# Patient Record
Sex: FEMALE | Race: BLACK OR AFRICAN AMERICAN | NOT HISPANIC OR LATINO | Employment: FULL TIME | URBAN - METROPOLITAN AREA
[De-identification: names, ages, dates, MRNs, and addresses within clinical notes are randomized per-mention and may not be internally consistent; named-entity substitution may affect disease eponyms.]

---

## 2017-08-20 ENCOUNTER — HOSPITAL ENCOUNTER (EMERGENCY)
Facility: OTHER | Age: 37
Discharge: HOME | End: 2017-08-20
Attending: INTERNAL MEDICINE
Payer: COMMERCIAL

## 2017-08-20 VITALS
HEIGHT: 62 IN | HEART RATE: 88 BPM | SYSTOLIC BLOOD PRESSURE: 154 MMHG | TEMPERATURE: 97 F | OXYGEN SATURATION: 97 % | BODY MASS INDEX: 39.01 KG/M2 | WEIGHT: 212 LBS | RESPIRATION RATE: 18 BRPM | DIASTOLIC BLOOD PRESSURE: 98 MMHG

## 2017-08-20 DIAGNOSIS — S00.83XA CONTUSION OF FACE, INITIAL ENCOUNTER: Primary | ICD-10-CM

## 2017-08-20 PROCEDURE — 25000003 PHARM REV CODE 250: Performed by: INTERNAL MEDICINE

## 2017-08-20 PROCEDURE — 99284 EMERGENCY DEPT VISIT MOD MDM: CPT

## 2017-08-20 RX ORDER — IBUPROFEN 400 MG/1
800 TABLET ORAL
Status: COMPLETED | OUTPATIENT
Start: 2017-08-20 | End: 2017-08-20

## 2017-08-20 RX ORDER — IBUPROFEN 800 MG/1
800 TABLET ORAL EVERY 8 HOURS PRN
Qty: 30 TABLET | Refills: 0 | Status: SHIPPED | OUTPATIENT
Start: 2017-08-20 | End: 2020-05-11

## 2017-08-20 RX ADMIN — IBUPROFEN 800 MG: 400 TABLET, FILM COATED ORAL at 01:08
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
## 2018-04-01 ENCOUNTER — HOSPITAL ENCOUNTER (EMERGENCY)
Facility: OTHER | Age: 38
Discharge: HOME | End: 2018-04-02
Attending: EMERGENCY MEDICINE
Payer: COMMERCIAL

## 2018-04-01 DIAGNOSIS — R10.13 EPIGASTRIC PAIN: Primary | ICD-10-CM

## 2018-04-01 DIAGNOSIS — R00.0 TACHYCARDIA: ICD-10-CM

## 2018-04-01 PROCEDURE — 96374 THER/PROPH/DIAG INJ IV PUSH: CPT

## 2018-04-01 PROCEDURE — 96375 TX/PRO/DX INJ NEW DRUG ADDON: CPT

## 2018-04-01 PROCEDURE — 93005 ELECTROCARDIOGRAM TRACING: CPT

## 2018-04-01 PROCEDURE — 96361 HYDRATE IV INFUSION ADD-ON: CPT

## 2018-04-01 PROCEDURE — 99284 EMERGENCY DEPT VISIT MOD MDM: CPT | Mod: 25

## 2018-04-01 PROCEDURE — 93010 ELECTROCARDIOGRAM REPORT: CPT | Mod: ,,, | Performed by: INTERNAL MEDICINE

## 2018-04-01 PROCEDURE — 25000003 PHARM REV CODE 250: Performed by: EMERGENCY MEDICINE

## 2018-04-01 PROCEDURE — 63600175 PHARM REV CODE 636 W HCPCS: Performed by: EMERGENCY MEDICINE

## 2018-04-01 RX ORDER — SODIUM CHLORIDE 9 MG/ML
1000 INJECTION, SOLUTION INTRAVENOUS
Status: COMPLETED | OUTPATIENT
Start: 2018-04-01 | End: 2018-04-02

## 2018-04-01 RX ORDER — ONDANSETRON 2 MG/ML
8 INJECTION INTRAMUSCULAR; INTRAVENOUS
Status: COMPLETED | OUTPATIENT
Start: 2018-04-01 | End: 2018-04-01

## 2018-04-01 RX ORDER — KETOROLAC TROMETHAMINE 30 MG/ML
30 INJECTION, SOLUTION INTRAMUSCULAR; INTRAVENOUS
Status: COMPLETED | OUTPATIENT
Start: 2018-04-01 | End: 2018-04-01

## 2018-04-01 RX ADMIN — LIDOCAINE HYDROCHLORIDE: 20 SOLUTION ORAL; TOPICAL at 11:04

## 2018-04-01 RX ADMIN — ONDANSETRON 8 MG: 2 INJECTION INTRAMUSCULAR; INTRAVENOUS at 11:04

## 2018-04-01 RX ADMIN — SODIUM CHLORIDE 1000 ML: 0.9 INJECTION, SOLUTION INTRAVENOUS at 11:04

## 2018-04-01 RX ADMIN — KETOROLAC TROMETHAMINE 30 MG: 30 INJECTION, SOLUTION INTRAMUSCULAR at 11:04

## 2018-04-02 VITALS
SYSTOLIC BLOOD PRESSURE: 121 MMHG | BODY MASS INDEX: 38.64 KG/M2 | HEIGHT: 62 IN | OXYGEN SATURATION: 95 % | TEMPERATURE: 99 F | WEIGHT: 210 LBS | HEART RATE: 110 BPM | DIASTOLIC BLOOD PRESSURE: 70 MMHG | RESPIRATION RATE: 18 BRPM

## 2018-04-02 LAB
ALBUMIN SERPL-MCNC: 4 G/DL (ref 3.3–5.5)
ALBUMIN SERPL-MCNC: 4.3 G/DL (ref 3.3–5.5)
ALP SERPL-CCNC: 100 U/L (ref 42–141)
ALP SERPL-CCNC: 94 U/L (ref 42–141)
BILIRUB SERPL-MCNC: 0.7 MG/DL (ref 0.2–1.6)
BILIRUB SERPL-MCNC: 0.7 MG/DL (ref 0.2–1.6)
BILIRUBIN, POC UA: NEGATIVE
BLOOD, POC UA: ABNORMAL
BUN SERPL-MCNC: 7 MG/DL (ref 7–22)
CALCIUM SERPL-MCNC: 9 MG/DL (ref 8–10.3)
CHLORIDE SERPL-SCNC: 103 MMOL/L (ref 98–108)
CLARITY, POC UA: CLEAR
COLOR, POC UA: YELLOW
CREAT SERPL-MCNC: 0.9 MG/DL (ref 0.6–1.2)
GLUCOSE SERPL-MCNC: 114 MG/DL (ref 73–118)
GLUCOSE, POC UA: NEGATIVE
HCO3 UR-SCNC: 26.2 MMOL/L (ref 24–28)
KETONES, POC UA: ABNORMAL
LDH SERPL L TO P-CCNC: 0.91 MMOL/L (ref 0.5–2.2)
LEUKOCYTE EST, POC UA: NEGATIVE
NITRITE, POC UA: NEGATIVE
PCO2 BLDA: 41.9 MMHG (ref 35–45)
PH SMN: 7.4 [PH] (ref 7.35–7.45)
PH UR STRIP: 6 [PH]
PO2 BLDA: 28 MMHG (ref 40–60)
POC ALT (SGPT): 15 U/L (ref 10–47)
POC ALT (SGPT): <5 U/L (ref 10–47)
POC AMYLASE: 35 U/L (ref 14–97)
POC AST (SGOT): 25 U/L (ref 11–38)
POC AST (SGOT): 26 U/L (ref 11–38)
POC BE: 1 MMOL/L
POC GGT: 32 U/L (ref 5–65)
POC SATURATED O2: 52 % (ref 95–100)
POC TCO2: 24 MMOL/L (ref 18–33)
POC TCO2: 27 MMOL/L (ref 24–29)
POTASSIUM BLD-SCNC: 3.6 MMOL/L (ref 3.6–5.1)
PROTEIN, POC UA: NEGATIVE
PROTEIN, POC: 7.8 G/DL (ref 6.4–8.1)
PROTEIN, POC: 7.9 G/DL (ref 6.4–8.1)
SAMPLE: ABNORMAL
SODIUM BLD-SCNC: 141 MMOL/L (ref 128–145)
SPECIFIC GRAVITY, POC UA: >=1.03
UROBILINOGEN, POC UA: 0.2 E.U./DL

## 2018-04-02 PROCEDURE — 81003 URINALYSIS AUTO W/O SCOPE: CPT

## 2018-04-02 PROCEDURE — 63600175 PHARM REV CODE 636 W HCPCS: Performed by: EMERGENCY MEDICINE

## 2018-04-02 PROCEDURE — 25500020 PHARM REV CODE 255: Performed by: EMERGENCY MEDICINE

## 2018-04-02 PROCEDURE — 80053 COMPREHEN METABOLIC PANEL: CPT

## 2018-04-02 PROCEDURE — 85025 COMPLETE CBC W/AUTO DIFF WBC: CPT

## 2018-04-02 PROCEDURE — 82040 ASSAY OF SERUM ALBUMIN: CPT

## 2018-04-02 PROCEDURE — 82803 BLOOD GASES ANY COMBINATION: CPT

## 2018-04-02 RX ORDER — METHYLPREDNISOLONE SOD SUCC 125 MG
125 VIAL (EA) INJECTION
Status: COMPLETED | OUTPATIENT
Start: 2018-04-02 | End: 2018-04-02

## 2018-04-02 RX ORDER — DIPHENHYDRAMINE HYDROCHLORIDE 50 MG/ML
50 INJECTION INTRAMUSCULAR; INTRAVENOUS
Status: COMPLETED | OUTPATIENT
Start: 2018-04-02 | End: 2018-04-02

## 2018-04-02 RX ORDER — HYDROMORPHONE HYDROCHLORIDE 2 MG/ML
1 INJECTION, SOLUTION INTRAMUSCULAR; INTRAVENOUS; SUBCUTANEOUS
Status: COMPLETED | OUTPATIENT
Start: 2018-04-02 | End: 2018-04-02

## 2018-04-02 RX ORDER — FAMOTIDINE 20 MG/1
20 TABLET, FILM COATED ORAL 2 TIMES DAILY
Qty: 20 TABLET | Refills: 0 | Status: SHIPPED | OUTPATIENT
Start: 2018-04-02 | End: 2020-05-11

## 2018-04-02 RX ORDER — PANTOPRAZOLE SODIUM 20 MG/1
20 TABLET, DELAYED RELEASE ORAL DAILY
Qty: 30 TABLET | Refills: 0 | Status: SHIPPED | OUTPATIENT
Start: 2018-04-02 | End: 2020-05-11

## 2018-04-02 RX ADMIN — DIPHENHYDRAMINE HYDROCHLORIDE 50 MG: 50 INJECTION, SOLUTION INTRAMUSCULAR; INTRAVENOUS at 01:04

## 2018-04-02 RX ADMIN — IOHEXOL 100 ML: 350 INJECTION, SOLUTION INTRAVENOUS at 01:04

## 2018-04-02 RX ADMIN — METHYLPREDNISOLONE SODIUM SUCCINATE 125 MG: 125 INJECTION, POWDER, FOR SOLUTION INTRAMUSCULAR; INTRAVENOUS at 01:04

## 2018-04-02 RX ADMIN — HYDROMORPHONE HYDROCHLORIDE 1 MG: 2 INJECTION, SOLUTION INTRAMUSCULAR; INTRAVENOUS; SUBCUTANEOUS at 12:04

## 2018-04-02 NOTE — ED PROVIDER NOTES
Encounter Date: 4/1/2018       History     Chief Complaint   Patient presents with    Abdominal Pain     pt c/o epigastric ABd pain, n/d -vomiting     The history is provided by the patient.   Abdominal Pain   The current episode started 3 to 5 hours ago. The onset of the illness was abrupt. The problem has been gradually worsening. The abdominal pain is located in the epigastric region. The abdominal pain radiates to the back. The abdominal pain is relieved by nothing. The other symptoms of the illness include nausea and diarrhea. The other symptoms of the illness do not include fever, melena, vomiting, dysuria, hematemesis, hematochezia or vaginal discharge.   The patient has had a change in bowel habit. Risk factors for an acute abdominal problem include a history of abdominal surgery (gastric sleeve 4 years ago).     Review of patient's allergies indicates:   Allergen Reactions    Percocet [oxycodone-acetaminophen]      History reviewed. No pertinent past medical history.  Past Surgical History:   Procedure Laterality Date    BREAST SURGERY      gastric sleeve       Family History   Problem Relation Age of Onset    Breast cancer Neg Hx     Colon cancer Neg Hx     Ovarian cancer Neg Hx      Social History   Substance Use Topics    Smoking status: Never Smoker    Smokeless tobacco: Never Used    Alcohol use Yes      Comment: socially     Review of Systems   Constitutional: Negative for fever.   Gastrointestinal: Positive for abdominal pain, diarrhea and nausea. Negative for hematemesis, hematochezia, melena and vomiting.   Genitourinary: Negative for dysuria and vaginal discharge.   All other systems reviewed and are negative.      Physical Exam     Initial Vitals [04/01/18 2324]   BP Pulse Resp Temp SpO2   (!) 151/98 (!) 122 20 98.5 °F (36.9 °C) 100 %      MAP       115.67         Physical Exam    Nursing note and vitals reviewed.  Constitutional: She appears well-developed and well-nourished. She is  not diaphoretic. No distress.   HENT:   Head: Normocephalic and atraumatic.   Eyes: Conjunctivae are normal. Pupils are equal, round, and reactive to light.   Neck: Normal range of motion. Neck supple.   Cardiovascular: Normal rate and intact distal pulses.   Pulmonary/Chest: No accessory muscle usage. No tachypnea. No respiratory distress.   Abdominal: Soft. Bowel sounds are normal. She exhibits no distension. There is generalized tenderness. There is no rebound, no guarding, no tenderness at McBurney's point and negative Gutierrez's sign.   Musculoskeletal: Normal range of motion. She exhibits no tenderness.   Neurological: She is alert and oriented to person, place, and time.   Skin: Skin is warm. Capillary refill takes less than 2 seconds.   Psychiatric: She has a normal mood and affect.         ED Course   Procedures  Labs Reviewed   POCT URINALYSIS W/O SCOPE - Abnormal; Notable for the following:        Result Value    Glucose, UA Negative (*)     Bilirubin, UA Negative (*)     Ketones, UA 1+ (*)     Spec Grav UA >=1.030 (*)     Blood, UA 2+ (*)     Protein, UA Negative (*)     Nitrite, UA Negative (*)     Leukocytes, UA Negative (*)     All other components within normal limits   ISTAT PROCEDURE - Abnormal; Notable for the following:     POC PO2 28 (*)     POC SATURATED O2 52 (*)     All other components within normal limits   POCT URINALYSIS W/O SCOPE   POCT CBC   POCT URINE PREGNANCY   POCT LIVER PANEL   POCT CMP   POCT CMP     EKG Readings: (Independently Interpreted)   Initial Reading: No STEMI. Rhythm: Sinus Tachycardia. Heart Rate: 112. Ectopy: No Ectopy. Conduction: Normal. ST Segments: Normal ST Segments. T Waves: Normal. Axis: Normal. Q Waves: III.          Medical Decision Making:   Clinical Tests:   Lab Tests: Ordered and Reviewed       <> Summary of Lab: White count 16 H&H 14.2 and 44.5 platelets 295 MCV 92.6 RDW 13.4  Radiological Study: Ordered and Reviewed           Labs Reviewed  Admission on  04/01/2018   Component Date Value Ref Range Status    Glucose, UA 04/02/2018 Negative*  Final    Bilirubin, UA 04/02/2018 Negative*  Final    Ketones, UA 04/02/2018 1+*  Final    Spec Grav UA 04/02/2018 >=1.030*  Final    Blood, UA 04/02/2018 2+*  Final    PH, UA 04/02/2018 6.0   Final    Protein, UA 04/02/2018 Negative*  Final    Urobilinogen, UA 04/02/2018 0.2  E.U./dL Final    Nitrite, UA 04/02/2018 Negative*  Final    Leukocytes, UA 04/02/2018 Negative*  Final    Color, UA 04/02/2018 Yellow   Final    Clarity, UA 04/02/2018 Clear   Final    POC PH 04/02/2018 7.404  7.35 - 7.45 Final    POC PCO2 04/02/2018 41.9  35 - 45 mmHg Final    POC PO2 04/02/2018 28* 40 - 60 mmHg Final    POC HCO3 04/02/2018 26.2  24 - 28 mmol/L Final    POC BE 04/02/2018 1  -2 to 2 mmol/L Final    POC SATURATED O2 04/02/2018 52* 95 - 100 % Final    POC Lactate 04/02/2018 0.91  0.5 - 2.2 mmol/L Final    POC TCO2 04/02/2018 27  24 - 29 mmol/L Final    Sample 04/02/2018 VENOUS   Final    Albumin, POC 04/02/2018 4.0  3.3 - 5.5 g/dL Final    Alkaline Phosphatase, POC 04/02/2018 94  42 - 141 U/L Final    ALT (SGPT), POC 04/02/2018 15  10 - 47 U/L Final    AST (SGOT), POC 04/02/2018 25  11 - 38 U/L Final    POC BUN 04/02/2018 7  7 - 22 mg/dL Final    Calcium, POC 04/02/2018 9.0  8.0 - 10.3 mg/dL Final    POC Chloride 04/02/2018 103  98 - 108 mmol/L Final    POC Creatinine 04/02/2018 0.9  0.6 - 1.2 mg/dL Final    POC Glucose 04/02/2018 114  73 - 118 mg/dL Final    POC Potassium 04/02/2018 3.6  3.6 - 5.1 mmol/L Final    POC Sodium 04/02/2018 141  128 - 145 mmol/L Final    Bilirubin 04/02/2018 0.7  0.2 - 1.6 mg/dL Final    POC TCO2 04/02/2018 24  18 - 33 mmol/L Final    Protein 04/02/2018 7.9  6.4 - 8.1 g/dL Final        Imaging Reviewed    Imaging Results          CT Abdomen Pelvis With Contrast (Final result)  Result time 04/02/18 01:26:33    Final result by Yash Jones MD (04/02/18 01:26:33)                  Impression:      1. Low lying IUD which appears to extend into the cervical canal.  Future pelvic ultrasound follow-up could be obtained to better assess position.  2. Otherwise no acute intra-abdominal abnormalities identified.  3. Postsurgical changes of sleeve gastrectomy.      Electronically signed by: Yash Jones MD  Date:    04/02/2018  Time:    01:26             Narrative:    EXAMINATION:  CT ABDOMEN PELVIS WITH CONTRAST    CLINICAL HISTORY:  Abdominal pain, unspecified;    TECHNIQUE:  Low dose axial images, sagittal and coronal reformations were obtained from the lung bases to the pubic symphysis following the IV administration of 100 mL of Omnipaque 350 .  Oral contrast was not given.    COMPARISON:  None.    FINDINGS:  The visualized portion of the heart is unremarkable.  The lung bases are clear.    The liver is normal in size, contour, and attenuation with no focal hepatic abnormality.  There is no intra-or extrahepatic biliary ductal dilatation.  The gallbladder is unremarkable.  Postsurgical changes seen consistent with sleeve gastrectomy.  Spleen, pancreas, and adrenal glands are unremarkable.    Kidneys are functioning.  No evidence of hydronephrosis.  Ureters and urinary bladder are unremarkable.  IUD is visualized which appears low lying likely extending into the cervical canal.    Appendix is visualized and is unremarkable.  The visualized loops of small and large bowel show no evidence of obstruction or inflammation.  No free air or free fluid.    Aorta tapers normally.    Osseous structures are unremarkable.  Subcutaneous soft tissue structures are unremarkable.                                Medications given in ED    Medications   omnipaque 350 iohexol 350 mg iodine/mL (not administered)   (pyxis) gi cocktail (mylanta 30 mL, lidocaine 2 % viscous 10 mL, dicyclomine 10 mL) 50 mL ( Oral Given 4/1/18 3855)   ketorolac injection 30 mg (30 mg Intravenous Given 4/1/18 1692)   ondansetron  injection 8 mg (8 mg Intravenous Given 4/1/18 1059)   0.9%  NaCl infusion (0 mLs Intravenous Stopped 4/2/18 0147)   HYDROmorphone (PF) injection 1 mg (1 mg Intravenous Given 4/2/18 0036)   omnipaque 350 iohexol 100 mL (100 mLs Intravenous Given 4/2/18 0110)   diphenhydrAMINE injection 50 mg (50 mg Intravenous Given 4/2/18 0143)   methylPREDNISolone sodium succinate injection 125 mg (125 mg Intravenous Given 4/2/18 0143)         Note was created using voice recognition software. Note may have occasional typographical errors that may not have been identified and edited despite good sharan initial review prior to signing.             ED Course as of Apr 02 0203   Mon Apr 02, 2018   0126 POC Creatinine: 0.9 [DL]   0151 Nitrite, UA: (!) Negative [DL]      ED Course User Index  [DL] Caro Archer MD     Labs Reviewed  Admission on 04/01/2018   Component Date Value Ref Range Status    Glucose, UA 04/02/2018 Negative*  Final    Bilirubin, UA 04/02/2018 Negative*  Final    Ketones, UA 04/02/2018 1+*  Final    Spec Grav UA 04/02/2018 >=1.030*  Final    Blood, UA 04/02/2018 2+*  Final    PH, UA 04/02/2018 6.0   Final    Protein, UA 04/02/2018 Negative*  Final    Urobilinogen, UA 04/02/2018 0.2  E.U./dL Final    Nitrite, UA 04/02/2018 Negative*  Final    Leukocytes, UA 04/02/2018 Negative*  Final    Color, UA 04/02/2018 Yellow   Final    Clarity, UA 04/02/2018 Clear   Final    POC PH 04/02/2018 7.404  7.35 - 7.45 Final    POC PCO2 04/02/2018 41.9  35 - 45 mmHg Final    POC PO2 04/02/2018 28* 40 - 60 mmHg Final    POC HCO3 04/02/2018 26.2  24 - 28 mmol/L Final    POC BE 04/02/2018 1  -2 to 2 mmol/L Final    POC SATURATED O2 04/02/2018 52* 95 - 100 % Final    POC Lactate 04/02/2018 0.91  0.5 - 2.2 mmol/L Final    POC TCO2 04/02/2018 27  24 - 29 mmol/L Final    Sample 04/02/2018 VENOUS   Final    Albumin, POC 04/02/2018 4.0  3.3 - 5.5 g/dL Final    Alkaline Phosphatase, POC 04/02/2018 94  42 - 141 U/L Final     ALT (SGPT), POC 04/02/2018 15  10 - 47 U/L Final    AST (SGOT), POC 04/02/2018 25  11 - 38 U/L Final    POC BUN 04/02/2018 7  7 - 22 mg/dL Final    Calcium, POC 04/02/2018 9.0  8.0 - 10.3 mg/dL Final    POC Chloride 04/02/2018 103  98 - 108 mmol/L Final    POC Creatinine 04/02/2018 0.9  0.6 - 1.2 mg/dL Final    POC Glucose 04/02/2018 114  73 - 118 mg/dL Final    POC Potassium 04/02/2018 3.6  3.6 - 5.1 mmol/L Final    POC Sodium 04/02/2018 141  128 - 145 mmol/L Final    Bilirubin 04/02/2018 0.7  0.2 - 1.6 mg/dL Final    POC TCO2 04/02/2018 24  18 - 33 mmol/L Final    Protein 04/02/2018 7.9  6.4 - 8.1 g/dL Final        Imaging Reviewed    Imaging Results          CT Abdomen Pelvis With Contrast (Final result)  Result time 04/02/18 01:26:33    Final result by Yash Jones MD (04/02/18 01:26:33)                 Impression:      1. Low lying IUD which appears to extend into the cervical canal.  Future pelvic ultrasound follow-up could be obtained to better assess position.  2. Otherwise no acute intra-abdominal abnormalities identified.  3. Postsurgical changes of sleeve gastrectomy.      Electronically signed by: Yash Jones MD  Date:    04/02/2018  Time:    01:26             Narrative:    EXAMINATION:  CT ABDOMEN PELVIS WITH CONTRAST    CLINICAL HISTORY:  Abdominal pain, unspecified;    TECHNIQUE:  Low dose axial images, sagittal and coronal reformations were obtained from the lung bases to the pubic symphysis following the IV administration of 100 mL of Omnipaque 350 .  Oral contrast was not given.    COMPARISON:  None.    FINDINGS:  The visualized portion of the heart is unremarkable.  The lung bases are clear.    The liver is normal in size, contour, and attenuation with no focal hepatic abnormality.  There is no intra-or extrahepatic biliary ductal dilatation.  The gallbladder is unremarkable.  Postsurgical changes seen consistent with sleeve gastrectomy.  Spleen, pancreas, and adrenal glands  are unremarkable.    Kidneys are functioning.  No evidence of hydronephrosis.  Ureters and urinary bladder are unremarkable.  IUD is visualized which appears low lying likely extending into the cervical canal.    Appendix is visualized and is unremarkable.  The visualized loops of small and large bowel show no evidence of obstruction or inflammation.  No free air or free fluid.    Aorta tapers normally.    Osseous structures are unremarkable.  Subcutaneous soft tissue structures are unremarkable.                                Medications given in ED    Medications   omnipaque 350 iohexol 350 mg iodine/mL (not administered)   (pyxis) gi cocktail (mylanta 30 mL, lidocaine 2 % viscous 10 mL, dicyclomine 10 mL) 50 mL ( Oral Given 4/1/18 2354)   ketorolac injection 30 mg (30 mg Intravenous Given 4/1/18 2354)   ondansetron injection 8 mg (8 mg Intravenous Given 4/1/18 2354)   0.9%  NaCl infusion (0 mLs Intravenous Stopped 4/2/18 0147)   HYDROmorphone (PF) injection 1 mg (1 mg Intravenous Given 4/2/18 0036)   omnipaque 350 iohexol 100 mL (100 mLs Intravenous Given 4/2/18 0110)   diphenhydrAMINE injection 50 mg (50 mg Intravenous Given 4/2/18 0143)   methylPREDNISolone sodium succinate injection 125 mg (125 mg Intravenous Given 4/2/18 0143)     Discharge Medications     Medication List with Changes/Refills   New Medications    FAMOTIDINE (PEPCID) 20 MG TABLET    Take 1 tablet (20 mg total) by mouth 2 (two) times daily.    PANTOPRAZOLE (PROTONIX) 20 MG TABLET    Take 1 tablet (20 mg total) by mouth once daily.   Current Medications    FLUTICASONE (FLONASE) 50 MCG/ACTUATION NASAL SPRAY        HYDROCODONE-HOMATROPINE 5-1.5 MG/5 ML (HYCODAN) 5-1.5 MG/5 ML SYRP    Take 5 mLs by mouth every 4 (four) hours as needed.    IBUPROFEN (ADVIL,MOTRIN) 800 MG TABLET    Take 1 tablet (800 mg total) by mouth every 8 (eight) hours as needed for Pain.    LEVONORGESTREL (MIRENA) 20 MCG/24 HR (5 YEARS) IUD    1 each by Intrauterine route once.     LORAZEPAM (ATIVAN) 2 MG TAB        MEPERIDINE (DEMEROL) 50 MG TABLET        MICROGESTIN FE 1/20, 28, 1 MG-20 MCG (21)/75 MG (7) PER TABLET    TAKE ONE TABLET BY MOUTH ONCE DAILY             Patient discharged to home in stable condition with instructions to:   1. Please take all meds as prescribed.  2. Follow-up with your primary care doctor   3. Return precautions discussed and patient and/or family/caretaker understands to return to the emergency room for any concerns including worsening of your current symptoms, fever, chills, night sweats, worsening pain, chest pain, shortness of breath, nausea, vomiting, diarrhea, bleeding, headache, difficulty talking, visual disturbances, weakness, numbness or any other acute concerns      Note was created using voice recognition software. Note may have occasional typographical errors that may not have been identified and edited despite good sharan initial review prior to signing.    Clinical Impression:   The primary encounter diagnosis was Epigastric pain. A diagnosis of Tachycardia was also pertinent to this visit.                           Caro Archer MD  04/30/18 2491

## 2018-07-09 ENCOUNTER — HOSPITAL ENCOUNTER (EMERGENCY)
Facility: HOSPITAL | Age: 38
Discharge: HOME | End: 2018-07-09
Payer: COMMERCIAL

## 2018-07-09 VITALS
RESPIRATION RATE: 17 BRPM | SYSTOLIC BLOOD PRESSURE: 109 MMHG | DIASTOLIC BLOOD PRESSURE: 56 MMHG | OXYGEN SATURATION: 100 % | WEIGHT: 210 LBS | TEMPERATURE: 98 F | HEART RATE: 81 BPM | BODY MASS INDEX: 38.64 KG/M2 | HEIGHT: 62 IN

## 2018-07-09 DIAGNOSIS — R19.7 DIARRHEA, UNSPECIFIED TYPE: Primary | ICD-10-CM

## 2018-07-09 DIAGNOSIS — R10.9 ABDOMINAL CRAMPING: ICD-10-CM

## 2018-07-09 LAB
ALBUMIN SERPL-MCNC: 3.4 G/DL (ref 3.3–5.5)
ALBUMIN SERPL-MCNC: 3.6 G/DL (ref 3.3–5.5)
ALP SERPL-CCNC: 64 U/L (ref 42–141)
ALP SERPL-CCNC: 70 U/L (ref 42–141)
B-HCG UR QL: NEGATIVE
BILIRUB SERPL-MCNC: 0.5 MG/DL (ref 0.2–1.6)
BILIRUB SERPL-MCNC: 0.6 MG/DL (ref 0.2–1.6)
BILIRUBIN, POC UA: ABNORMAL
BLOOD, POC UA: ABNORMAL
BUN SERPL-MCNC: 5 MG/DL (ref 7–22)
CALCIUM SERPL-MCNC: 8.7 MG/DL (ref 8–10.3)
CHLORIDE SERPL-SCNC: 106 MMOL/L (ref 98–108)
CLARITY, POC UA: CLEAR
COLOR, POC UA: YELLOW
CREAT SERPL-MCNC: 1.1 MG/DL (ref 0.6–1.2)
CTP QC/QA: YES
GLUCOSE SERPL-MCNC: 98 MG/DL (ref 73–118)
GLUCOSE, POC UA: NEGATIVE
KETONES, POC UA: NEGATIVE
LEUKOCYTE EST, POC UA: NEGATIVE
NITRITE, POC UA: NEGATIVE
PH UR STRIP: 5.5 [PH]
POC ALT (SGPT): 17 U/L (ref 10–47)
POC ALT (SGPT): 18 U/L (ref 10–47)
POC AMYLASE: 33 U/L (ref 14–97)
POC AST (SGOT): 25 U/L (ref 11–38)
POC AST (SGOT): 26 U/L (ref 11–38)
POC GGT: 33 U/L (ref 5–65)
POC TCO2: 29 MMOL/L (ref 18–33)
POTASSIUM BLD-SCNC: 3.8 MMOL/L (ref 3.6–5.1)
PROTEIN, POC UA: ABNORMAL
PROTEIN, POC: 6.8 G/DL (ref 6.4–8.1)
PROTEIN, POC: 6.8 G/DL (ref 6.4–8.1)
SODIUM BLD-SCNC: 141 MMOL/L (ref 128–145)
SPECIFIC GRAVITY, POC UA: >=1.03
UROBILINOGEN, POC UA: 0.2 E.U./DL

## 2018-07-09 PROCEDURE — 80053 COMPREHEN METABOLIC PANEL: CPT

## 2018-07-09 PROCEDURE — 96375 TX/PRO/DX INJ NEW DRUG ADDON: CPT

## 2018-07-09 PROCEDURE — 81003 URINALYSIS AUTO W/O SCOPE: CPT

## 2018-07-09 PROCEDURE — 82150 ASSAY OF AMYLASE: CPT

## 2018-07-09 PROCEDURE — 85025 COMPLETE CBC W/AUTO DIFF WBC: CPT

## 2018-07-09 PROCEDURE — 25500020 PHARM REV CODE 255: Performed by: NURSE PRACTITIONER

## 2018-07-09 PROCEDURE — 25000003 PHARM REV CODE 250: Performed by: NURSE PRACTITIONER

## 2018-07-09 PROCEDURE — 81025 URINE PREGNANCY TEST: CPT | Performed by: NURSE PRACTITIONER

## 2018-07-09 PROCEDURE — 99284 EMERGENCY DEPT VISIT MOD MDM: CPT | Mod: 25

## 2018-07-09 PROCEDURE — 63600175 PHARM REV CODE 636 W HCPCS: Performed by: NURSE PRACTITIONER

## 2018-07-09 PROCEDURE — 96374 THER/PROPH/DIAG INJ IV PUSH: CPT | Mod: 59

## 2018-07-09 RX ORDER — CIPROFLOXACIN 500 MG/1
500 TABLET ORAL EVERY 12 HOURS
Qty: 14 TABLET | Refills: 0 | Status: SHIPPED | OUTPATIENT
Start: 2018-07-09 | End: 2018-07-16

## 2018-07-09 RX ORDER — DICYCLOMINE HYDROCHLORIDE 10 MG/ML
20 INJECTION INTRAMUSCULAR
Status: COMPLETED | OUTPATIENT
Start: 2018-07-09 | End: 2018-07-09

## 2018-07-09 RX ORDER — DIPHENHYDRAMINE HYDROCHLORIDE 50 MG/ML
50 INJECTION INTRAMUSCULAR; INTRAVENOUS
Status: COMPLETED | OUTPATIENT
Start: 2018-07-09 | End: 2018-07-09

## 2018-07-09 RX ORDER — DICYCLOMINE HYDROCHLORIDE 20 MG/1
20 TABLET ORAL EVERY 8 HOURS PRN
Qty: 20 TABLET | Refills: 0 | Status: SHIPPED | OUTPATIENT
Start: 2018-07-09 | End: 2020-05-11

## 2018-07-09 RX ORDER — ONDANSETRON 2 MG/ML
4 INJECTION INTRAMUSCULAR; INTRAVENOUS
Status: COMPLETED | OUTPATIENT
Start: 2018-07-09 | End: 2018-07-09

## 2018-07-09 RX ORDER — KETOROLAC TROMETHAMINE 30 MG/ML
15 INJECTION, SOLUTION INTRAMUSCULAR; INTRAVENOUS
Status: COMPLETED | OUTPATIENT
Start: 2018-07-09 | End: 2018-07-09

## 2018-07-09 RX ORDER — ONDANSETRON 4 MG/1
4 TABLET, FILM COATED ORAL EVERY 6 HOURS PRN
Qty: 12 TABLET | Refills: 0 | Status: SHIPPED | OUTPATIENT
Start: 2018-07-09 | End: 2020-05-11

## 2018-07-09 RX ADMIN — ONDANSETRON 4 MG: 2 INJECTION INTRAMUSCULAR; INTRAVENOUS at 05:07

## 2018-07-09 RX ADMIN — LIDOCAINE HYDROCHLORIDE: 20 SOLUTION ORAL; TOPICAL at 07:07

## 2018-07-09 RX ADMIN — DIPHENHYDRAMINE HYDROCHLORIDE 50 MG: 50 INJECTION INTRAMUSCULAR; INTRAVENOUS at 06:07

## 2018-07-09 RX ADMIN — DICYCLOMINE HYDROCHLORIDE 20 MG: 20 INJECTION, SOLUTION INTRAMUSCULAR at 05:07

## 2018-07-09 RX ADMIN — SODIUM CHLORIDE 1000 ML: 0.9 INJECTION, SOLUTION INTRAVENOUS at 06:07

## 2018-07-09 RX ADMIN — KETOROLAC TROMETHAMINE 15 MG: 30 INJECTION, SOLUTION INTRAMUSCULAR; INTRAVENOUS at 06:07

## 2018-07-09 RX ADMIN — IOHEXOL 100 ML: 350 INJECTION, SOLUTION INTRAVENOUS at 06:07

## 2018-07-09 NOTE — ED NOTES
Pt c/o epigastric pain and burping w diarrhea x 3 days.  Pain is non radiating.  Denies vomiting, accompanied by nausea.

## 2018-07-09 NOTE — ED PROVIDER NOTES
Encounter Date: 7/9/2018       History     Chief Complaint   Patient presents with    Abdominal Cramping     pt reports abdominal cramping, diarrhea and nausea since Thursday. Pt reports pain 9/10 on pain scale. Pt denies any vomiting    Diarrhea    Nausea     The history is provided by the patient. No  was used.   Abdominal Pain   The current episode started several days ago. The onset of the illness was gradual. The problem has been gradually worsening. The abdominal pain is located in the periumbilical region. The abdominal pain radiates to the epigastric region and suprapubic region. The severity of the abdominal pain is 7/10. The abdominal pain is relieved by being still. The abdominal pain is exacerbated by movement and certain positions. The other symptoms of the illness include nausea and diarrhea. The other symptoms of the illness do not include fever, fatigue, jaundice, melena, shortness of breath, vomiting, dysuria, hematemesis, hematochezia, vaginal discharge or vaginal bleeding.   The patient has had a change in bowel habit (decreased). Symptoms associated with the illness do not include constipation, hematuria or back pain.     Review of patient's allergies indicates:   Allergen Reactions    Percocet [oxycodone-acetaminophen]      History reviewed. No pertinent past medical history.  Past Surgical History:   Procedure Laterality Date    BREAST SURGERY      gastric sleeve       Family History   Problem Relation Age of Onset    Breast cancer Neg Hx     Colon cancer Neg Hx     Ovarian cancer Neg Hx      Social History   Substance Use Topics    Smoking status: Never Smoker    Smokeless tobacco: Never Used    Alcohol use Yes      Comment: socially     Review of Systems   Constitutional: Negative.  Negative for appetite change, fatigue and fever.   HENT: Negative.  Negative for congestion, dental problem, ear discharge, hearing loss, mouth sores, rhinorrhea and trouble  swallowing.    Eyes: Negative.  Negative for pain and discharge.   Respiratory: Negative.  Negative for shortness of breath.    Cardiovascular: Negative.  Negative for chest pain.   Gastrointestinal: Positive for abdominal pain, diarrhea and nausea. Negative for abdominal distention, constipation, hematemesis, hematochezia, jaundice, melena, rectal pain and vomiting.   Endocrine: Negative.    Genitourinary: Negative.  Negative for dyspareunia, dysuria, hematuria, vaginal bleeding, vaginal discharge and vaginal pain.   Musculoskeletal: Negative for back pain and neck pain.   Skin: Negative.  Negative for rash.   Allergic/Immunologic: Negative.    Neurological: Negative.  Negative for facial asymmetry, speech difficulty and light-headedness.   Hematological: Negative.    Psychiatric/Behavioral: Negative.  Negative for agitation, dysphoric mood and sleep disturbance.   All other systems reviewed and are negative.      Physical Exam     Initial Vitals [07/09/18 1656]   BP Pulse Resp Temp SpO2   134/85 99 18 97.9 °F (36.6 °C) 100 %      MAP       --         Physical Exam    Nursing note and vitals reviewed.  Constitutional: She appears well-developed and well-nourished. She is not diaphoretic.  Non-toxic appearance. She does not appear ill. No distress.   HENT:   Head: Normocephalic and atraumatic.   Eyes: Conjunctivae are normal. Right eye exhibits no discharge. Left eye exhibits no discharge.   Neck: Normal range of motion.   Cardiovascular: Normal rate, regular rhythm, normal heart sounds and intact distal pulses. Exam reveals no gallop and no friction rub.    No murmur heard.  Pulmonary/Chest: Breath sounds normal. No respiratory distress. She has no wheezes. She has no rhonchi. She has no rales. She exhibits no tenderness.   Abdominal: Soft. Normal appearance and bowel sounds are normal. She exhibits no shifting dullness, no distension, no pulsatile liver, no fluid wave, no abdominal bruit, no ascites, no pulsatile  midline mass and no mass. There is no hepatosplenomegaly. There is tenderness in the periumbilical area. There is no rigidity, no rebound, no guarding, no CVA tenderness, no tenderness at McBurney's point and negative Gutierrez's sign. No hernia.   Musculoskeletal: Normal range of motion.   Neurological: She is alert and oriented to person, place, and time.   Skin: Skin is warm and dry. Capillary refill takes less than 2 seconds. No rash noted.   Psychiatric: She has a normal mood and affect. Her behavior is normal. Judgment and thought content normal.         ED Course   Procedures  Labs Reviewed   POCT URINALYSIS W/O SCOPE - Abnormal; Notable for the following:        Result Value    Glucose, UA Negative (*)     Bilirubin, UA 1+ (*)     Ketones, UA Negative (*)     Spec Grav UA >=1.030 (*)     Blood, UA 2+ (*)     Protein, UA 1+ (*)     Nitrite, UA Negative (*)     Leukocytes, UA Negative (*)     All other components within normal limits   POCT CMP - Abnormal; Notable for the following:     POC BUN 5 (*)     All other components within normal limits   POCT URINE PREGNANCY   POCT CBC   POCT AMYLASE   POCT CMP   POCT LIVER PANEL          Imaging Results          CT Abdomen Pelvis With Contrast (Final result)  Result time 07/09/18 18:44:38    Final result by Yash Jones MD (07/09/18 18:44:38)                 Impression:      1. No acute intra-abdominal abnormalities identified.  2. Low lying IUD which appears to extend into the cervical canal.  This is unchanged in position from prior CT from 04/02/2018.  3. Postsurgical changes of sleeve gastrectomy.  No complication seen.      Electronically signed by: Yash Jones MD  Date:    07/09/2018  Time:    18:44             Narrative:    EXAMINATION:  CT ABDOMEN PELVIS WITH CONTRAST    CLINICAL HISTORY:  Abdominal pain, unspecified;    TECHNIQUE:  Low dose axial images, sagittal and coronal reformations were obtained from the lung bases to the pubic symphysis  following the IV administration of 100 mL of Omnipaque 350 .  Oral contrast was not given.    COMPARISON:  CT abdomen and pelvis from 04/02/2018.    FINDINGS:  The visualized portion of the heart is unremarkable.  The lung bases are clear.    No significant hepatic abnormalities are identified.  There is no intra-or extrahepatic biliary ductal dilatation.  The gallbladder is unremarkable.  Postsurgical changes are visualized consistent with sleeve gastrectomy.  Spleen, pancreas, and adrenal glands show no significant abnormalities.    Kidneys enhance normally with no evidence of hydronephrosis.  No abnormalities are seen along the ureteral courses.  Urinary bladder is nondistended.  IUD is visualized and low lying position likely extending into the cervical canal.  This is unchanged from previous CT from 04/02/2018.    Appendix is visualized and is unremarkable.  The visualized loops of small and large bowel show no evidence of obstruction or inflammation.  No free air or free fluid.    Aorta tapers normally.    No acute osseous abnormality identified. Subcutaneous soft tissue structures are unremarkable.                                 Medical Decision Making:   Initial Assessment:   Diarrhea, Gastritis  Differential Diagnosis:   Intra-abdominal abnormality, electrolyte abnormality, UTI, elevated white blood cell count  Clinical Tests:   Lab Tests: Reviewed and Ordered  The following lab test(s) were unremarkable: CBC, Urinalysis and CMP  Radiological Study: Ordered and Reviewed  ED Management:  The Bentyl IM, Zofran p.o., GI cocktail p.o. and 1 L normal saline bolus IV given in the ER.  Upon reassessment the patient reports mild improvement in pain.  Patient is informed that her symptoms are likely due to her diarrhea and she will be discharged home on ciprofloxacin and align probiotic as well as Bentyl and Zofran.  Patient is instructed to eat a BRAT diet, drink 6-8 glasses of water daily, follow up with her  primary care provider tomorrow and return to the ER as needed if symptoms worsen or fail to improve.  The patient verbalized understanding of discharge instructions and treatment plan.                      Clinical Impression:   The primary encounter diagnosis was Diarrhea, unspecified type. A diagnosis of Abdominal cramping was also pertinent to this visit.                             Toussaint Battley III, ROBERT  07/09/18 0992

## 2018-07-10 NOTE — ED NOTES
20 G iv to Left AC D/C'd catheter intact, no bleeding or hematoma noted, covered site with gauze and coban elastic, pt. Tolerated well

## 2019-05-25 ENCOUNTER — HOSPITAL ENCOUNTER (EMERGENCY)
Facility: HOSPITAL | Age: 39
Discharge: HOME | End: 2019-05-25
Attending: INTERNAL MEDICINE
Payer: COMMERCIAL

## 2019-05-25 VITALS
TEMPERATURE: 98 F | HEIGHT: 62 IN | WEIGHT: 215 LBS | SYSTOLIC BLOOD PRESSURE: 148 MMHG | OXYGEN SATURATION: 100 % | RESPIRATION RATE: 18 BRPM | BODY MASS INDEX: 39.56 KG/M2 | DIASTOLIC BLOOD PRESSURE: 89 MMHG | HEART RATE: 92 BPM

## 2019-05-25 DIAGNOSIS — S92.505A CLOSED NONDISPLACED FRACTURE OF PHALANX OF LESSER TOE OF LEFT FOOT, UNSPECIFIED PHALANX, INITIAL ENCOUNTER: Primary | ICD-10-CM

## 2019-05-25 LAB
B-HCG UR QL: NEGATIVE
CTP QC/QA: YES

## 2019-05-25 PROCEDURE — 81025 URINE PREGNANCY TEST: CPT | Mod: ER | Performed by: INTERNAL MEDICINE

## 2019-05-25 PROCEDURE — 99284 EMERGENCY DEPT VISIT MOD MDM: CPT | Mod: 25,ER

## 2019-05-25 PROCEDURE — 25000003 PHARM REV CODE 250: Mod: ER | Performed by: PHYSICIAN ASSISTANT

## 2019-05-25 RX ORDER — HYDROCODONE BITARTRATE AND ACETAMINOPHEN 5; 325 MG/1; MG/1
1 TABLET ORAL
Status: COMPLETED | OUTPATIENT
Start: 2019-05-25 | End: 2019-05-25

## 2019-05-25 RX ORDER — HYDROCODONE BITARTRATE AND ACETAMINOPHEN 5; 325 MG/1; MG/1
1 TABLET ORAL EVERY 4 HOURS PRN
Qty: 15 TABLET | Refills: 0 | Status: SHIPPED | OUTPATIENT
Start: 2019-05-25 | End: 2020-05-11

## 2019-05-25 RX ORDER — IBUPROFEN 400 MG/1
800 TABLET ORAL
Status: DISCONTINUED | OUTPATIENT
Start: 2019-05-25 | End: 2019-05-25

## 2019-05-25 RX ADMIN — HYDROCODONE BITARTRATE AND ACETAMINOPHEN 1 TABLET: 5; 325 TABLET ORAL at 08:05

## 2019-05-26 NOTE — ED PROVIDER NOTES
Encounter Date: 5/25/2019    SCRIBE #1 NOTE: I, Vee Castillo, am scribing for, and in the presence of,  MADDI Rodriguez. I have scribed the following portions of the note - Other sections scribed: HPI, ROS,PE.       History     Chief Complaint   Patient presents with    Toe Pain     pt c/o L 5th toe pain x 7 days, s/p fall      This is a 38 year old female complaining of left 5th toe pain s/p fall x 1 week. Pain has been gradually worsening and has started experiencing a shooting pain to her ankle. Patient reports using Voltaren gel for treatment.    The history is provided by the patient. No  was used.     Review of patient's allergies indicates:   Allergen Reactions    Percocet [oxycodone-acetaminophen]      Past Medical History:   Diagnosis Date    GERD (gastroesophageal reflux disease)      Past Surgical History:   Procedure Laterality Date    BREAST SURGERY      gastric sleeve       Family History   Problem Relation Age of Onset    Breast cancer Neg Hx     Colon cancer Neg Hx     Ovarian cancer Neg Hx      Social History     Tobacco Use    Smoking status: Never Smoker    Smokeless tobacco: Never Used   Substance Use Topics    Alcohol use: Yes     Comment: socially    Drug use: No     Review of Systems   Constitutional: Negative for fever.   Eyes: Negative for redness.   Musculoskeletal: Positive for arthralgias (left 5th toe).   Skin: Negative for rash and wound.   Neurological: Negative for weakness and numbness.   All other systems reviewed and are negative.      Physical Exam     Initial Vitals [05/25/19 1955]   BP Pulse Resp Temp SpO2   (!) 154/96 104 18 98.4 °F (36.9 °C) 97 %      MAP       --         Physical Exam    Nursing note and vitals reviewed.  Constitutional: She appears well-developed and well-nourished.   HENT:   Head: Normocephalic and atraumatic.   Right Ear: External ear normal.   Left Ear: External ear normal.   Eyes: Conjunctivae are normal.   Neck:  Neck supple.   Cardiovascular: Normal rate, regular rhythm and intact distal pulses.   Pulmonary/Chest: Breath sounds normal. No respiratory distress.   Musculoskeletal: Normal range of motion.   Neurological: She is alert and oriented to person, place, and time.   Skin: Skin is warm and dry.   Psychiatric: She has a normal mood and affect.         ED Course   Procedures  Labs Reviewed   POCT URINE PREGNANCY          Imaging Results          X-Ray Toe 2 or More Views Left (Final result)  Result time 05/25/19 20:14:25    Final result by Yash Jones MD (05/25/19 20:14:25)                 Impression:      Acute fracture of the 5th toe proximal phalanx.      Electronically signed by: Yash Jones MD  Date:    05/25/2019  Time:    20:14             Narrative:    EXAMINATION:  XR TOE 2 OR MORE VIEWS LEFT    CLINICAL HISTORY:  pain;    TECHNIQUE:  Three views of the left toes were performed    COMPARISON:  None.    FINDINGS:  Acute obliquely oriented, minimally displaced fracture is seen of the 5th toe proximal phalanx with intra-articular extension to the PIP joint.  No additional fractures or dislocation seen.  No radiopaque retained foreign body identified.                                 Medical Decision Making:   Initial Assessment:   This is a 38 year old female complaining of left 5th toe pain s/p fall x 1 week. Pain has been gradually worsening and has started experiencing a shooting pain to her ankle. Patient reports using Voltaren gel for treatment.    Clinical Tests:   Radiological Study: Ordered and Reviewed  ED Management:  Left 5th toe fracture was found bone x-ray.  Fracture toes prashanth-taped and patient was given instructions for left 5th toe fracture as well as hydrocodone.  She was advised to follow up with primary care physician within the next 2 days for re-evaluation and to return to the emergency department if condition worsens.            Scribe Attestation:   Scribe #1: I performed the above  scribed service and the documentation accurately describes the services I performed. I attest to the accuracy of the note.    This document was produced by a scribe under my direction and in my presence. I agree with the content of the note and have made any necessary edits.     Dr. Braun    05/26/2019 6:43 AM             Clinical Impression:     1. Closed nondisplaced fracture of phalanx of lesser toe of left foot, unspecified phalanx, initial encounter            Disposition:   Disposition: Discharged  Condition: Stable                        Salomon Braun MD  05/26/19 0643

## 2020-05-10 ENCOUNTER — HOSPITAL ENCOUNTER (INPATIENT)
Facility: HOSPITAL | Age: 40
LOS: 3 days | Discharge: HOME | DRG: 690 | End: 2020-05-14
Attending: EMERGENCY MEDICINE | Admitting: HOSPITALIST
Payer: COMMERCIAL

## 2020-05-10 DIAGNOSIS — N28.9 RENAL INSUFFICIENCY: ICD-10-CM

## 2020-05-10 DIAGNOSIS — R10.33 PERIUMBILICAL ABDOMINAL PAIN: ICD-10-CM

## 2020-05-10 DIAGNOSIS — N10 ACUTE PYELONEPHRITIS: Primary | ICD-10-CM

## 2020-05-10 DIAGNOSIS — E87.0 HYPERNATREMIA: ICD-10-CM

## 2020-05-10 LAB
ALBUMIN SERPL-MCNC: 3.4 G/DL (ref 3.3–5.5)
ALBUMIN SERPL-MCNC: 3.4 G/DL (ref 3.3–5.5)
ALLENS TEST: ABNORMAL
ALP SERPL-CCNC: 102 U/L (ref 42–141)
ALP SERPL-CCNC: 96 U/L (ref 42–141)
B-HCG UR QL: NEGATIVE
BILIRUB SERPL-MCNC: 0.7 MG/DL (ref 0.2–1.6)
BILIRUB SERPL-MCNC: 0.8 MG/DL (ref 0.2–1.6)
BILIRUBIN, POC UA: NEGATIVE
BLOOD, POC UA: ABNORMAL
BUN SERPL-MCNC: 10 MG/DL (ref 7–22)
CALCIUM SERPL-MCNC: 9.2 MG/DL (ref 8–10.3)
CHLORIDE SERPL-SCNC: 104 MMOL/L (ref 98–108)
CLARITY, POC UA: ABNORMAL
COLOR, POC UA: ABNORMAL
CREAT SERPL-MCNC: 2.6 MG/DL (ref 0.6–1.2)
CTP QC/QA: YES
GLUCOSE SERPL-MCNC: 96 MG/DL (ref 73–118)
GLUCOSE, POC UA: NEGATIVE
HCO3 UR-SCNC: 18.7 MMOL/L (ref 24–28)
KETONES, POC UA: NEGATIVE
LDH SERPL L TO P-CCNC: 3.1 MMOL/L (ref 0.5–2.2)
LEUKOCYTE EST, POC UA: ABNORMAL
NITRITE, POC UA: NEGATIVE
PCO2 BLDA: 31.2 MMHG (ref 35–45)
PH SMN: 7.39 [PH] (ref 7.35–7.45)
PH UR STRIP: 7 [PH]
PO2 BLDA: 80 MMHG (ref 40–60)
POC ALT (SGPT): 26 U/L (ref 10–47)
POC ALT (SGPT): 27 U/L (ref 10–47)
POC AMYLASE: 49 U/L (ref 14–97)
POC AST (SGOT): 43 U/L (ref 11–38)
POC AST (SGOT): 47 U/L (ref 11–38)
POC BE: -5 MMOL/L
POC GGT: 82 U/L (ref 5–65)
POC SATURATED O2: 96 % (ref 95–100)
POC TCO2: 20 MMOL/L (ref 18–33)
POC TCO2: 20 MMOL/L (ref 24–29)
POTASSIUM BLD-SCNC: 3.6 MMOL/L (ref 3.6–5.1)
PROTEIN, POC UA: ABNORMAL
PROTEIN, POC: 7.1 G/DL (ref 6.4–8.1)
PROTEIN, POC: 7.1 G/DL (ref 6.4–8.1)
SAMPLE: ABNORMAL
SITE: ABNORMAL
SODIUM BLD-SCNC: 137 MMOL/L (ref 128–145)
SPECIFIC GRAVITY, POC UA: 1.02
UROBILINOGEN, POC UA: 0.2 E.U./DL

## 2020-05-10 PROCEDURE — 96375 TX/PRO/DX INJ NEW DRUG ADDON: CPT

## 2020-05-10 PROCEDURE — 85025 COMPLETE CBC W/AUTO DIFF WBC: CPT | Mod: ER

## 2020-05-10 PROCEDURE — 96365 THER/PROPH/DIAG IV INF INIT: CPT

## 2020-05-10 PROCEDURE — 63600175 PHARM REV CODE 636 W HCPCS: Mod: ER | Performed by: EMERGENCY MEDICINE

## 2020-05-10 PROCEDURE — 82803 BLOOD GASES ANY COMBINATION: CPT | Mod: ER

## 2020-05-10 PROCEDURE — 96361 HYDRATE IV INFUSION ADD-ON: CPT

## 2020-05-10 PROCEDURE — 81025 URINE PREGNANCY TEST: CPT | Mod: ER | Performed by: EMERGENCY MEDICINE

## 2020-05-10 PROCEDURE — 80053 COMPREHEN METABOLIC PANEL: CPT | Mod: ER

## 2020-05-10 PROCEDURE — 81003 URINALYSIS AUTO W/O SCOPE: CPT | Mod: ER

## 2020-05-10 PROCEDURE — 25000003 PHARM REV CODE 250: Mod: ER | Performed by: EMERGENCY MEDICINE

## 2020-05-10 PROCEDURE — 82040 ASSAY OF SERUM ALBUMIN: CPT | Mod: ER

## 2020-05-10 RX ORDER — ONDANSETRON 2 MG/ML
8 INJECTION INTRAMUSCULAR; INTRAVENOUS
Status: COMPLETED | OUTPATIENT
Start: 2020-05-10 | End: 2020-05-10

## 2020-05-10 RX ORDER — MORPHINE SULFATE 8 MG/ML
6 INJECTION INTRAMUSCULAR; INTRAVENOUS; SUBCUTANEOUS
Status: COMPLETED | OUTPATIENT
Start: 2020-05-10 | End: 2020-05-10

## 2020-05-10 RX ORDER — KETOROLAC TROMETHAMINE 30 MG/ML
15 INJECTION, SOLUTION INTRAMUSCULAR; INTRAVENOUS
Status: COMPLETED | OUTPATIENT
Start: 2020-05-10 | End: 2020-05-10

## 2020-05-10 RX ADMIN — MORPHINE SULFATE 6 MG: 8 INJECTION INTRAVENOUS at 11:05

## 2020-05-10 RX ADMIN — SODIUM CHLORIDE 1000 ML: 0.9 INJECTION, SOLUTION INTRAVENOUS at 11:05

## 2020-05-10 RX ADMIN — ONDANSETRON 8 MG: 2 INJECTION, SOLUTION INTRAMUSCULAR; INTRAVENOUS at 10:05

## 2020-05-10 RX ADMIN — KETOROLAC TROMETHAMINE 15 MG: 30 INJECTION, SOLUTION INTRAMUSCULAR at 10:05

## 2020-05-10 RX ADMIN — CEFTRIAXONE 1 G: 1 INJECTION, SOLUTION INTRAVENOUS at 11:05

## 2020-05-10 RX ADMIN — SODIUM CHLORIDE 1000 ML: 0.9 INJECTION, SOLUTION INTRAVENOUS at 10:05

## 2020-05-11 PROBLEM — N10 ACUTE PYELONEPHRITIS: Status: ACTIVE | Noted: 2020-05-11

## 2020-05-11 PROBLEM — R10.33 PERIUMBILICAL ABDOMINAL PAIN: Status: ACTIVE | Noted: 2020-05-11

## 2020-05-11 PROBLEM — N17.1 ACUTE RENAL FAILURE WITH ACUTE RENAL CORTICAL NECROSIS SUPERIMPOSED ON STAGE 4 CHRONIC KIDNEY DISEASE: Status: ACTIVE | Noted: 2020-05-11

## 2020-05-11 PROBLEM — E87.0 HYPERNATREMIA: Status: ACTIVE | Noted: 2020-05-11

## 2020-05-11 PROBLEM — N18.9 ACUTE ON CHRONIC RENAL FAILURE: Status: ACTIVE | Noted: 2020-05-11

## 2020-05-11 PROBLEM — N18.4 ACUTE RENAL FAILURE WITH ACUTE RENAL CORTICAL NECROSIS SUPERIMPOSED ON STAGE 4 CHRONIC KIDNEY DISEASE: Status: ACTIVE | Noted: 2020-05-11

## 2020-05-11 PROBLEM — N17.9 ACUTE ON CHRONIC RENAL FAILURE: Status: ACTIVE | Noted: 2020-05-11

## 2020-05-11 LAB
ALBUMIN SERPL-MCNC: 2.9 G/DL (ref 3.3–5.5)
ALP SERPL-CCNC: 82 U/L (ref 42–141)
ANION GAP SERPL CALC-SCNC: 9 MMOL/L (ref 8–16)
BACTERIA #/AREA URNS HPF: ABNORMAL /HPF
BILIRUB SERPL-MCNC: 0.6 MG/DL (ref 0.2–1.6)
BILIRUB UR QL STRIP: NEGATIVE
BUN SERPL-MCNC: 10 MG/DL (ref 7–22)
BUN SERPL-MCNC: 13 MG/DL (ref 6–20)
CALCIUM SERPL-MCNC: 8.2 MG/DL (ref 8–10.3)
CALCIUM SERPL-MCNC: 8.6 MG/DL (ref 8.7–10.5)
CHLORIDE SERPL-SCNC: 106 MMOL/L (ref 95–110)
CHLORIDE SERPL-SCNC: 107 MMOL/L (ref 98–108)
CLARITY UR: CLEAR
CO2 SERPL-SCNC: 22 MMOL/L (ref 23–29)
COLOR UR: ABNORMAL
CREAT SERPL-MCNC: 2.4 MG/DL (ref 0.6–1.2)
CREAT SERPL-MCNC: 3.2 MG/DL (ref 0.5–1.4)
CREAT UR-MCNC: 33.4 MG/DL (ref 15–325)
D DIMER PPP IA.FEU-MCNC: 0.56 MG/L FEU
EST. GFR  (AFRICAN AMERICAN): 20 ML/MIN/1.73 M^2
EST. GFR  (NON AFRICAN AMERICAN): 17 ML/MIN/1.73 M^2
FERRITIN SERPL-MCNC: 115 NG/ML (ref 20–300)
GLUCOSE SERPL-MCNC: 106 MG/DL (ref 73–118)
GLUCOSE SERPL-MCNC: 117 MG/DL (ref 70–110)
GLUCOSE UR QL STRIP: NEGATIVE
HGB UR QL STRIP: ABNORMAL
HYALINE CASTS #/AREA URNS LPF: 0 /LPF
KETONES UR QL STRIP: NEGATIVE
LDH SERPL L TO P-CCNC: 302 U/L (ref 110–260)
LEUKOCYTE ESTERASE UR QL STRIP: NEGATIVE
MICROSCOPIC COMMENT: ABNORMAL
NITRITE UR QL STRIP: NEGATIVE
OSMOLALITY UR: 134 MOSM/KG (ref 50–1200)
PH UR STRIP: 6 [PH] (ref 5–8)
POC ALT (SGPT): 27 U/L (ref 10–47)
POC AST (SGOT): 48 U/L (ref 11–38)
POC TCO2: 24 MMOL/L (ref 18–33)
POTASSIUM BLD-SCNC: 4.5 MMOL/L (ref 3.6–5.1)
POTASSIUM SERPL-SCNC: 4.3 MMOL/L (ref 3.5–5.1)
PROT UR QL STRIP: ABNORMAL
PROT UR-MCNC: 70 MG/DL
PROT/CREAT UR: 2.1 MG/G{CREAT} (ref 0–0.2)
PROTEIN, POC: 6 G/DL (ref 6.4–8.1)
RBC #/AREA URNS HPF: 3 /HPF (ref 0–4)
SARS-COV-2 RDRP RESP QL NAA+PROBE: NEGATIVE
SODIUM BLD-SCNC: 150 MMOL/L (ref 128–145)
SODIUM SERPL-SCNC: 137 MMOL/L (ref 136–145)
SODIUM UR-SCNC: 43 MMOL/L (ref 20–250)
SP GR UR STRIP: 1 (ref 1–1.03)
SQUAMOUS #/AREA URNS HPF: 4 /HPF
URN SPEC COLLECT METH UR: ABNORMAL
UROBILINOGEN UR STRIP-ACNC: NEGATIVE EU/DL
WBC #/AREA URNS HPF: 2 /HPF (ref 0–5)

## 2020-05-11 PROCEDURE — 96375 TX/PRO/DX INJ NEW DRUG ADDON: CPT

## 2020-05-11 PROCEDURE — 36415 COLL VENOUS BLD VENIPUNCTURE: CPT

## 2020-05-11 PROCEDURE — 99285 EMERGENCY DEPT VISIT HI MDM: CPT | Mod: 25

## 2020-05-11 PROCEDURE — 84156 ASSAY OF PROTEIN URINE: CPT

## 2020-05-11 PROCEDURE — 63600175 PHARM REV CODE 636 W HCPCS: Mod: ER | Performed by: EMERGENCY MEDICINE

## 2020-05-11 PROCEDURE — 11000001 HC ACUTE MED/SURG PRIVATE ROOM

## 2020-05-11 PROCEDURE — 82728 ASSAY OF FERRITIN: CPT

## 2020-05-11 PROCEDURE — U0002 COVID-19 LAB TEST NON-CDC: HCPCS

## 2020-05-11 PROCEDURE — 96376 TX/PRO/DX INJ SAME DRUG ADON: CPT

## 2020-05-11 PROCEDURE — 87086 URINE CULTURE/COLONY COUNT: CPT

## 2020-05-11 PROCEDURE — 25000003 PHARM REV CODE 250: Performed by: NURSE PRACTITIONER

## 2020-05-11 PROCEDURE — 83935 ASSAY OF URINE OSMOLALITY: CPT

## 2020-05-11 PROCEDURE — 85379 FIBRIN DEGRADATION QUANT: CPT

## 2020-05-11 PROCEDURE — 63600175 PHARM REV CODE 636 W HCPCS: Performed by: NURSE PRACTITIONER

## 2020-05-11 PROCEDURE — 81000 URINALYSIS NONAUTO W/SCOPE: CPT

## 2020-05-11 PROCEDURE — 83615 LACTATE (LD) (LDH) ENZYME: CPT

## 2020-05-11 PROCEDURE — 80048 BASIC METABOLIC PNL TOTAL CA: CPT

## 2020-05-11 PROCEDURE — 84300 ASSAY OF URINE SODIUM: CPT

## 2020-05-11 PROCEDURE — 80053 COMPREHEN METABOLIC PANEL: CPT | Mod: ER

## 2020-05-11 RX ORDER — CEPHALEXIN 500 MG/1
500 CAPSULE ORAL EVERY 12 HOURS
Status: DISCONTINUED | OUTPATIENT
Start: 2020-05-11 | End: 2020-05-11

## 2020-05-11 RX ORDER — DIPHENHYDRAMINE HYDROCHLORIDE 50 MG/ML
25 INJECTION INTRAMUSCULAR; INTRAVENOUS
Status: COMPLETED | OUTPATIENT
Start: 2020-05-11 | End: 2020-05-11

## 2020-05-11 RX ORDER — SODIUM CHLORIDE 0.9 % (FLUSH) 0.9 %
10 SYRINGE (ML) INJECTION
Status: DISCONTINUED | OUTPATIENT
Start: 2020-05-11 | End: 2020-05-11

## 2020-05-11 RX ORDER — MORPHINE SULFATE 8 MG/ML
6 INJECTION INTRAMUSCULAR; INTRAVENOUS; SUBCUTANEOUS ONCE
Status: COMPLETED | OUTPATIENT
Start: 2020-05-11 | End: 2020-05-11

## 2020-05-11 RX ORDER — KETOROLAC TROMETHAMINE 30 MG/ML
15 INJECTION, SOLUTION INTRAMUSCULAR; INTRAVENOUS EVERY 6 HOURS PRN
Status: DISCONTINUED | OUTPATIENT
Start: 2020-05-11 | End: 2020-05-11

## 2020-05-11 RX ORDER — METHYLPREDNISOLONE SOD SUCC 125 MG
125 VIAL (EA) INJECTION
Status: COMPLETED | OUTPATIENT
Start: 2020-05-11 | End: 2020-05-11

## 2020-05-11 RX ORDER — ENOXAPARIN SODIUM 100 MG/ML
40 INJECTION SUBCUTANEOUS EVERY 24 HOURS
Status: DISCONTINUED | OUTPATIENT
Start: 2020-05-11 | End: 2020-05-13

## 2020-05-11 RX ORDER — ONDANSETRON 8 MG/1
8 TABLET, ORALLY DISINTEGRATING ORAL ONCE
Status: COMPLETED | OUTPATIENT
Start: 2020-05-11 | End: 2020-05-11

## 2020-05-11 RX ORDER — OXYCODONE AND ACETAMINOPHEN 5; 325 MG/1; MG/1
1 TABLET ORAL EVERY 6 HOURS PRN
Status: DISCONTINUED | OUTPATIENT
Start: 2020-05-11 | End: 2020-05-14 | Stop reason: HOSPADM

## 2020-05-11 RX ORDER — CIPROFLOXACIN 500 MG/1
500 TABLET ORAL 2 TIMES DAILY
Qty: 20 TABLET | Refills: 0 | Status: SHIPPED | OUTPATIENT
Start: 2020-05-11 | End: 2020-05-11

## 2020-05-11 RX ORDER — ONDANSETRON 8 MG/1
8 TABLET, ORALLY DISINTEGRATING ORAL EVERY 6 HOURS PRN
Qty: 12 TABLET | Refills: 0 | Status: SHIPPED | OUTPATIENT
Start: 2020-05-11 | End: 2020-05-11

## 2020-05-11 RX ORDER — TRAMADOL HYDROCHLORIDE 50 MG/1
50 TABLET ORAL EVERY 6 HOURS PRN
Qty: 12 TABLET | Refills: 0 | Status: SHIPPED | OUTPATIENT
Start: 2020-05-11 | End: 2020-05-11

## 2020-05-11 RX ORDER — DIPHENHYDRAMINE HYDROCHLORIDE 50 MG/ML
25 INJECTION INTRAMUSCULAR; INTRAVENOUS ONCE
Status: DISCONTINUED | OUTPATIENT
Start: 2020-05-11 | End: 2020-05-11

## 2020-05-11 RX ORDER — SODIUM CHLORIDE 9 MG/ML
INJECTION, SOLUTION INTRAVENOUS CONTINUOUS
Status: DISCONTINUED | OUTPATIENT
Start: 2020-05-11 | End: 2020-05-11

## 2020-05-11 RX ORDER — CIPROFLOXACIN 500 MG/1
500 TABLET ORAL EVERY 12 HOURS
Status: DISCONTINUED | OUTPATIENT
Start: 2020-05-11 | End: 2020-05-12

## 2020-05-11 RX ORDER — DIPHENHYDRAMINE HYDROCHLORIDE 50 MG/ML
50 INJECTION INTRAMUSCULAR; INTRAVENOUS
Status: COMPLETED | OUTPATIENT
Start: 2020-05-11 | End: 2020-05-11

## 2020-05-11 RX ORDER — DIPHENHYDRAMINE HYDROCHLORIDE 50 MG/ML
25 INJECTION INTRAMUSCULAR; INTRAVENOUS
Status: DISCONTINUED | OUTPATIENT
Start: 2020-05-11 | End: 2020-05-11

## 2020-05-11 RX ORDER — SODIUM CHLORIDE 9 MG/ML
INJECTION, SOLUTION INTRAVENOUS CONTINUOUS
Status: DISCONTINUED | OUTPATIENT
Start: 2020-05-11 | End: 2020-05-12

## 2020-05-11 RX ORDER — DIPHENHYDRAMINE HYDROCHLORIDE 50 MG/ML
6.25 INJECTION INTRAMUSCULAR; INTRAVENOUS EVERY 6 HOURS PRN
Status: DISCONTINUED | OUTPATIENT
Start: 2020-05-11 | End: 2020-05-14 | Stop reason: HOSPADM

## 2020-05-11 RX ADMIN — ENOXAPARIN SODIUM 40 MG: 100 INJECTION SUBCUTANEOUS at 05:05

## 2020-05-11 RX ADMIN — DIPHENHYDRAMINE HYDROCHLORIDE 25 MG: 50 INJECTION INTRAMUSCULAR; INTRAVENOUS at 06:05

## 2020-05-11 RX ADMIN — DIPHENHYDRAMINE HYDROCHLORIDE 25 MG: 50 INJECTION INTRAMUSCULAR; INTRAVENOUS at 04:05

## 2020-05-11 RX ADMIN — METHYLPREDNISOLONE SODIUM SUCCINATE 40 MG: 40 INJECTION, POWDER, FOR SOLUTION INTRAMUSCULAR; INTRAVENOUS at 03:05

## 2020-05-11 RX ADMIN — ONDANSETRON 8 MG: 8 TABLET, ORALLY DISINTEGRATING ORAL at 10:05

## 2020-05-11 RX ADMIN — DIPHENHYDRAMINE HYDROCHLORIDE 25 MG: 50 INJECTION INTRAMUSCULAR; INTRAVENOUS at 07:05

## 2020-05-11 RX ADMIN — DIPHENHYDRAMINE HYDROCHLORIDE 6.25 MG: 50 INJECTION INTRAMUSCULAR; INTRAVENOUS at 01:05

## 2020-05-11 RX ADMIN — OXYCODONE HYDROCHLORIDE AND ACETAMINOPHEN 1 TABLET: 5; 325 TABLET ORAL at 08:05

## 2020-05-11 RX ADMIN — SODIUM CHLORIDE: 0.9 INJECTION, SOLUTION INTRAVENOUS at 01:05

## 2020-05-11 RX ADMIN — DIPHENHYDRAMINE HYDROCHLORIDE 50 MG: 50 INJECTION INTRAMUSCULAR; INTRAVENOUS at 02:05

## 2020-05-11 RX ADMIN — CIPROFLOXACIN 500 MG: 500 TABLET, FILM COATED ORAL at 08:05

## 2020-05-11 RX ADMIN — DIPHENHYDRAMINE HYDROCHLORIDE 6.25 MG: 50 INJECTION INTRAMUSCULAR; INTRAVENOUS at 08:05

## 2020-05-11 RX ADMIN — MORPHINE SULFATE 6 MG: 8 INJECTION INTRAVENOUS at 04:05

## 2020-05-11 RX ADMIN — METHYLPREDNISOLONE SODIUM SUCCINATE 125 MG: 125 INJECTION, POWDER, FOR SOLUTION INTRAMUSCULAR; INTRAVENOUS at 06:05

## 2020-05-11 RX ADMIN — CEFTRIAXONE 1 G: 1 INJECTION, SOLUTION INTRAVENOUS at 09:05

## 2020-05-11 RX ADMIN — OXYCODONE HYDROCHLORIDE AND ACETAMINOPHEN 1 TABLET: 5; 325 TABLET ORAL at 01:05

## 2020-05-11 RX ADMIN — SODIUM CHLORIDE: 0.9 INJECTION, SOLUTION INTRAVENOUS at 08:05

## 2020-05-11 RX ADMIN — KETOROLAC TROMETHAMINE 15 MG: 30 INJECTION, SOLUTION INTRAMUSCULAR at 10:05

## 2020-05-11 NOTE — ED PROVIDER NOTES
"Encounter Date: 5/10/2020       History     Chief Complaint   Patient presents with    Back Pain     Pt c/o back pain, lower abdominal pain and vomiting since earlier today. reports taking tylenol around 3pm and pepto bismol 1 hour PTA but denies relief    Abdominal Pain     lower abdominal pain that began earlier today with intermittent nausea and vomiting. denies diarrhea    Vomiting     39 y.o. Female with prior gastric sleeve surgery presents to ED c/o acute periumbilical "aching, burning" abdominal pain that radiates to the mid lower back that began suddenly about 2 hours prior to arrival. She reports associated nausea and 5-6 episodes of non-bloody, yellow liquid emesis. Last meal was a ham and cheese poboy around noon today. Denies fever, diarrhea, constipation, dysuria,, hematuria, frequency, or vaginal discharge.    She reports similar symptoms about a year ago when she was diagnosed with "gastritis".    The history is provided by the patient.     Review of patient's allergies indicates:   Allergen Reactions    Morphine Itching    Percocet [oxycodone-acetaminophen]      Past Medical History:   Diagnosis Date    GERD (gastroesophageal reflux disease)      Past Surgical History:   Procedure Laterality Date    BREAST SURGERY      gastric sleeve       Family History   Problem Relation Age of Onset    Breast cancer Neg Hx     Colon cancer Neg Hx     Ovarian cancer Neg Hx      Social History     Tobacco Use    Smoking status: Never Smoker    Smokeless tobacco: Never Used   Substance Use Topics    Alcohol use: Yes     Comment: socially    Drug use: No     Review of Systems   Constitutional: Negative for appetite change and fever.   Respiratory: Negative for shortness of breath.    Cardiovascular: Negative for chest pain.   Gastrointestinal: Positive for abdominal pain, nausea and vomiting. Negative for blood in stool, constipation and diarrhea.   Genitourinary: Negative for dysuria, frequency, " hematuria and vaginal bleeding.   Musculoskeletal: Positive for back pain (bilateral lower back).   All other systems reviewed and are negative.      Physical Exam     Initial Vitals [05/10/20 2130]   BP Pulse Resp Temp SpO2   (!) 146/98 94 18 99.2 °F (37.3 °C) 96 %      MAP       --         Physical Exam    Nursing note and vitals reviewed.  Constitutional: She appears well-developed and well-nourished. She is not diaphoretic. No distress.   HENT:   Head: Normocephalic and atraumatic.   Eyes: Conjunctivae are normal. Pupils are equal, round, and reactive to light.   Neck: Normal range of motion. Neck supple.   Cardiovascular: Normal rate and intact distal pulses.   Pulmonary/Chest: No accessory muscle usage or stridor. No tachypnea. No respiratory distress.   Abdominal: Soft. She exhibits no distension. There is tenderness in the epigastric area and periumbilical area. There is guarding. There is no rigidity, no rebound, no CVA tenderness, no tenderness at McBurney's point and negative Gutierrez's sign.   Musculoskeletal: Normal range of motion. She exhibits no tenderness.   Neurological: She is alert and oriented to person, place, and time. She has normal strength. Gait normal. GCS eye subscore is 4. GCS verbal subscore is 5. GCS motor subscore is 6.   Skin: Skin is warm. Capillary refill takes less than 2 seconds.   Psychiatric: She has a normal mood and affect.         ED Course   Procedures  Labs Reviewed   POCT URINALYSIS W/O SCOPE - Abnormal; Notable for the following components:       Result Value    Blood, UA 2+ (*)     Protein, UA 3+ (*)     Leukocytes, UA Trace (*)     All other components within normal limits   ISTAT PROCEDURE - Abnormal; Notable for the following components:    POC PCO2 31.2 (*)     POC PO2 80 (*)     POC HCO3 18.7 (*)     POC Lactate 3.10 (*)     POC TCO2 20 (*)     All other components within normal limits   POCT CMP - Abnormal; Notable for the following components:    AST (SGOT), POC 43  (*)     POC Creatinine 2.6 (*)     All other components within normal limits   POCT LIVER PANEL - Abnormal; Notable for the following components:    AST (SGOT), POC 47 (*)     POC GGT 82 (*)     All other components within normal limits   POCT CMP - Abnormal; Notable for the following components:    AST (SGOT), POC 48 (*)     POC Creatinine 2.4 (*)     POC Sodium 150 (*)     Protein 6.0 (*)     All other components within normal limits   CULTURE, URINE   SARS-COV-2 RNA AMPLIFICATION, QUAL   POCT URINE PREGNANCY   POCT URINALYSIS W/O SCOPE   POCT CBC   POCT CMP   POCT LIVER PANEL   POCT CMP          Imaging Results           CT Abdomen Pelvis  Without Contrast (Final result)  Result time 05/10/20 23:14:45    Final result by Saji Porras MD (05/10/20 23:14:45)                 Impression:      1. Perinephric fluid and stranding on the left and minimally on the right.  This could represent pyelonephritis.  Recommend clinical correlation and follow-up.  See above comments.  2. Intrauterine device present in the lower uterine segment and cervix.  This is unchanged from the prior study.  Recommend GYN follow-up for better positioning.  3. 4.1 cm left ovarian cyst.  Recommend outpatient sonographic surveillance.  4. Chronic and postoperative changes.  See above comments.  5.  This report was flagged in Epic as abnormal.      Electronically signed by: Saji Porras  Date:    05/10/2020  Time:    23:14             Narrative:    EXAMINATION:  CT ABDOMEN PELVIS WITHOUT CONTRAST    CLINICAL HISTORY:  Abd pain, gastroenteritis or colitis suspected;    TECHNIQUE:  Low dose axial images, sagittal and coronal reformations were obtained from the lung bases to the pubic symphysis, Oral contrast was not administered.    COMPARISON:  07/09/2018    FINDINGS:  Heart: Normal in size. No pericardial effusion.    Lung Bases: Well aerated, without consolidation or pleural fluid.    Liver: Normal in size and attenuation, with no focal  hepatic lesions.    Gallbladder: No calcified gallstones.    Bile Ducts: No evidence of dilated ducts.    Pancreas: No mass or peripancreatic fat stranding.    Spleen: Unremarkable.    Adrenals: Unremarkable.    Kidneys/ Ureters: No stones are detected bilaterally.  There is mild left perinephric fluid and stranding present.  Minimal stranding on the right also.  No mass is detected.  No hydronephrosis bilaterally.  An infectious/inflammatory process such as pyelonephritis is a consideration.  Recommend clinical correlation and follow-up.    Bladder: No evidence of wall thickening.    Reproductive organs: The uterus is present.  Intrauterine device is present.  The intrauterine device is situated in the lower uterine segment and cervix.  Recommend GYN follow-up for better positioning.    4.1 cm left ovarian cyst.  Recommend outpatient sonographic surveillance.    GI Tract/Mesentery: No evidence of bowel obstruction or inflammation.    Postoperative changes of the stomach.    Peritoneal Space: No ascites. No free air.    Retroperitoneum: No significant adenopathy.    Abdominal wall: Small fat containing right anterior lateral abdominal wall hernia through the abdominal musculature on axial 32 possibly related to a prior surgical tract.  Minimal stranding in the left anterior abdomen subcutaneous fat.    Minimal fat containing umbilical hernia also.  These findings are unchanged.    Vasculature: No significant atherosclerosis or aneurysm.    Bones: No acute fracture.                                 Medical Decision Making:   Clinical Tests:   Lab Tests: Ordered and Reviewed  Radiological Study: Ordered and Reviewed  ED Management:  Notified by nurse patient c/o itching. No rash. Denies SOB or throat swelling.  Benadryl ordered.  Pain resolved.  No longer dry heaving and no emesis throughout ED course thus far. Caro Archer MD, Emergency Medicine Staff 1:59 AM 5/11/2020             Labs Reviewed  Admission on  05/10/2020   Component Date Value Ref Range Status    POC Preg Test, Ur 05/10/2020 Negative  Negative Final     Acceptable 05/10/2020 Yes   Final    Glucose, UA 05/10/2020 Negative   Final    Bilirubin, UA 05/10/2020 Negative   Final    Ketones, UA 05/10/2020 Negative   Final    Spec Grav UA 05/10/2020 1.020   Final    Blood, UA 05/10/2020 2+*  Final    PH, UA 05/10/2020 7.0   Final    Protein, UA 05/10/2020 3+*  Final    Urobilinogen, UA 05/10/2020 0.2  E.U./dL Final    Nitrite, UA 05/10/2020 Negative   Final    Leukocytes, UA 05/10/2020 Trace*  Final    Color, UA 05/10/2020 Light yellow   Final    Clarity, UA 05/10/2020 Slightly Cloudy   Final    POC PH 05/10/2020 7.387  7.35 - 7.45 Final    POC PCO2 05/10/2020 31.2* 35 - 45 mmHg Final    POC PO2 05/10/2020 80* 40 - 60 mmHg Final    POC HCO3 05/10/2020 18.7* 24 - 28 mmol/L Final    POC BE 05/10/2020 -5  -2 to 2 mmol/L Final    POC SATURATED O2 05/10/2020 96  95 - 100 % Final    POC Lactate 05/10/2020 3.10* 0.5 - 2.2 mmol/L Final    POC TCO2 05/10/2020 20* 24 - 29 mmol/L Final    Sample 05/10/2020 VENOUS   Final    Site 05/10/2020 Other   Final    Allens Test 05/10/2020 N/A   Final    Albumin, POC 05/10/2020 3.4  3.3 - 5.5 g/dL Final    Alkaline Phosphatase, POC 05/10/2020 102  42 - 141 U/L Final    ALT (SGPT), POC 05/10/2020 27  10 - 47 U/L Final    AST (SGOT), POC 05/10/2020 43* 11 - 38 U/L Final    POC BUN 05/10/2020 10  7 - 22 mg/dL Final    Calcium, POC 05/10/2020 9.2  8 - 10.3 mg/dL Final    POC Chloride 05/10/2020 104  98 - 108 mmol/L Final    POC Creatinine 05/10/2020 2.6* 0.6 - 1.2 mg/dL Final    POC Glucose 05/10/2020 96  73 - 118 mg/dL Final    POC Potassium 05/10/2020 3.6  3.6 - 5.1 mmol/L Final    POC Sodium 05/10/2020 137  128 - 145 mmol/L Final    Bilirubin 05/10/2020 0.8  0.2 - 1.6 mg/dL Final    POC TCO2 05/10/2020 20  18 - 33 mmol/L Final    Protein 05/10/2020 7.1  6.4 - 8.1 g/dL Final     Albumin, POC 05/10/2020 3.4  3.3 - 5.5 g/dL Final    Alkaline Phosphatase, POC 05/10/2020 96  42 - 141 U/L Final    ALT (SGPT), POC 05/10/2020 26  10 - 47 U/L Final    Amylase, POC 05/10/2020 49  14 - 97 U/L Final    AST (SGOT), POC 05/10/2020 47* 11 - 38 U/L Final    POC GGT 05/10/2020 82* 5 - 65 U/L Final    Bilirubin 05/10/2020 0.7  0.2 - 1.6 mg/dL Final    Protein 05/10/2020 7.1  6.4 - 8.1 g/dL Final    Albumin, POC 05/11/2020 2.9  3.3 - 5.5 g/dL Final    Alkaline Phosphatase, POC 05/11/2020 82  42 - 141 U/L Final    ALT (SGPT), POC 05/11/2020 27  10 - 47 U/L Final    AST (SGOT), POC 05/11/2020 48* 11 - 38 U/L Final    POC BUN 05/11/2020 10  7 - 22 mg/dL Final    Calcium, POC 05/11/2020 8.2  8 - 10.3 mg/dL Final    POC Chloride 05/11/2020 107  98 - 108 mmol/L Final    POC Creatinine 05/11/2020 2.4* 0.6 - 1.2 mg/dL Final    POC Glucose 05/11/2020 106  73 - 118 mg/dL Final    POC Potassium 05/11/2020 4.5  3.6 - 5.1 mmol/L Final    POC Sodium 05/11/2020 150* 128 - 145 mmol/L Final    Bilirubin 05/11/2020 0.6  0.2 - 1.6 mg/dL Final    POC TCO2 05/11/2020 24  18 - 33 mmol/L Final    Protein 05/11/2020 6.0* 6.4 - 8.1 g/dL Final        Imaging Reviewed    Imaging Results           CT Abdomen Pelvis  Without Contrast (Final result)  Result time 05/10/20 23:14:45    Final result by Saji Porras MD (05/10/20 23:14:45)                 Impression:      1. Perinephric fluid and stranding on the left and minimally on the right.  This could represent pyelonephritis.  Recommend clinical correlation and follow-up.  See above comments.  2. Intrauterine device present in the lower uterine segment and cervix.  This is unchanged from the prior study.  Recommend GYN follow-up for better positioning.  3. 4.1 cm left ovarian cyst.  Recommend outpatient sonographic surveillance.  4. Chronic and postoperative changes.  See above comments.  5.  This report was flagged in Epic as abnormal.      Electronically signed  by: Saji Joe  Date:    05/10/2020  Time:    23:14             Narrative:    EXAMINATION:  CT ABDOMEN PELVIS WITHOUT CONTRAST    CLINICAL HISTORY:  Abd pain, gastroenteritis or colitis suspected;    TECHNIQUE:  Low dose axial images, sagittal and coronal reformations were obtained from the lung bases to the pubic symphysis, Oral contrast was not administered.    COMPARISON:  07/09/2018    FINDINGS:  Heart: Normal in size. No pericardial effusion.    Lung Bases: Well aerated, without consolidation or pleural fluid.    Liver: Normal in size and attenuation, with no focal hepatic lesions.    Gallbladder: No calcified gallstones.    Bile Ducts: No evidence of dilated ducts.    Pancreas: No mass or peripancreatic fat stranding.    Spleen: Unremarkable.    Adrenals: Unremarkable.    Kidneys/ Ureters: No stones are detected bilaterally.  There is mild left perinephric fluid and stranding present.  Minimal stranding on the right also.  No mass is detected.  No hydronephrosis bilaterally.  An infectious/inflammatory process such as pyelonephritis is a consideration.  Recommend clinical correlation and follow-up.    Bladder: No evidence of wall thickening.    Reproductive organs: The uterus is present.  Intrauterine device is present.  The intrauterine device is situated in the lower uterine segment and cervix.  Recommend GYN follow-up for better positioning.    4.1 cm left ovarian cyst.  Recommend outpatient sonographic surveillance.    GI Tract/Mesentery: No evidence of bowel obstruction or inflammation.    Postoperative changes of the stomach.    Peritoneal Space: No ascites. No free air.    Retroperitoneum: No significant adenopathy.    Abdominal wall: Small fat containing right anterior lateral abdominal wall hernia through the abdominal musculature on axial 32 possibly related to a prior surgical tract.  Minimal stranding in the left anterior abdomen subcutaneous fat.    Minimal fat containing umbilical hernia  also.  These findings are unchanged.    Vasculature: No significant atherosclerosis or aneurysm.    Bones: No acute fracture.                                Medications given in ED    Medications   sodium chloride 0.9% bolus 1,000 mL (0 mLs Intravenous Stopped 5/10/20 2322)   ondansetron injection 8 mg (8 mg Intravenous Given 5/10/20 2216)   ketorolac injection 15 mg (15 mg Intravenous Given 5/10/20 2216)   cefTRIAXone (ROCEPHIN) 1 g/50 mL D5W IVPB (0 g Intravenous Stopped 5/11/20 0047)   sodium chloride 0.9% bolus 1,000 mL (0 mLs Intravenous Stopped 5/11/20 0102)   morphine injection 6 mg (6 mg Intravenous Given 5/10/20 2346)   diphenhydrAMINE injection 50 mg (50 mg Intravenous Given 5/11/20 0205)   morphine injection 6 mg (6 mg Intravenous Given 5/11/20 0427)   diphenhydrAMINE injection 25 mg (25 mg Intravenous Given 5/11/20 0445)   diphenhydrAMINE injection 25 mg (25 mg Intravenous Given 5/11/20 0606)   methylPREDNISolone sodium succinate injection 125 mg (125 mg Intravenous Given 5/11/20 0607)       Note was created using voice recognition software. Note may have occasional typographical errors that may not have been identified and edited despite good sharan initial review prior to signing.                                 Clinical Impression:       ICD-10-CM ICD-9-CM   1. Acute pyelonephritis N10 590.10   2. Periumbilical abdominal pain R10.33 789.05   3. Renal insufficiency N28.9 593.9   4. Hypernatremia E87.0 276.0             ED Disposition Condition    Observation                           Caro Archer MD  05/11/20 2575

## 2020-05-11 NOTE — PROGRESS NOTES
Discharge Instructions for Pyelonephritis  You have been told you have a kidney infection. This is called pyelonephritis. The infection can be serious. It can damage your kidneys and cause bacteria to enter your bloodstream. You were treated in the hospital. Once you return home, heres what you can do at home to aid in your recovery and prevent future infections.  Home care  · Take all the medicine you were prescribed, even if you feel better. Not finishing the medicine can make the infection come back. It may also make a future infection harder to treat.  · Unless told not to by your healthcare provider, drink 8 to 12 glasses of fluid every day. Clear fluids, such as water, are best. This may help flush the infection from your system.  Preventing future infection  · Keep your genital area clean. Use mild soap. Rinse with water.  · If you are a woman, always wipe the genital area from front to back.  · Urinate frequently. Avoid holding urine in the bladder for a long time.  · Always urinate after sexual intercourse.  Follow-up care  Follow up with your healthcare provider, or as advised. And see your healthcare provider for regular lab tests as directed.     When to call your healthcare provider  Call your healthcare provider right away if you have any of the following:  · Decreased urine output or trouble urinating  · Severe pain in the lower back or flank  · Fever of 100.4°F (38°C) or higher, or as directed by your healthcare provider  · Shaking chills  · Vomiting  · Blood in your urine  · Dark-colored or foul-smelling urine  · Nausea or other problems that prevent you from taking your

## 2020-05-11 NOTE — SUBJECTIVE & OBJECTIVE
Past Medical History:   Diagnosis Date    GERD (gastroesophageal reflux disease)        Past Surgical History:   Procedure Laterality Date    BREAST SURGERY      gastric sleeve         Review of patient's allergies indicates:   Allergen Reactions    Morphine Itching    Percocet [oxycodone-acetaminophen]        No current facility-administered medications on file prior to encounter.      Current Outpatient Medications on File Prior to Encounter   Medication Sig    levonorgestrel (MIRENA) 20 mcg/24 hr (5 years) IUD 1 each by Intrauterine route once.    [DISCONTINUED] Bifidobacterium infantis (ALIGN) 4 mg Cap Take 1 capsule by mouth once daily.    [DISCONTINUED] dicyclomine (BENTYL) 20 mg tablet Take 1 tablet (20 mg total) by mouth every 8 (eight) hours as needed (abdominal cramps).    [DISCONTINUED] famotidine (PEPCID) 20 MG tablet Take 1 tablet (20 mg total) by mouth 2 (two) times daily.    [DISCONTINUED] fluticasone (FLONASE) 50 mcg/actuation nasal spray     [DISCONTINUED] HYDROcodone-acetaminophen (NORCO) 5-325 mg per tablet Take 1 tablet by mouth every 4 (four) hours as needed.    [DISCONTINUED] hydrocodone-homatropine 5-1.5 mg/5 ml (HYCODAN) 5-1.5 mg/5 mL Syrp Take 5 mLs by mouth every 4 (four) hours as needed.    [DISCONTINUED] ibuprofen (ADVIL,MOTRIN) 800 MG tablet Take 1 tablet (800 mg total) by mouth every 8 (eight) hours as needed for Pain.    [DISCONTINUED] lorazepam (ATIVAN) 2 MG Tab     [DISCONTINUED] meperidine (DEMEROL) 50 mg tablet     [DISCONTINUED] ondansetron (ZOFRAN) 4 MG tablet Take 1 tablet (4 mg total) by mouth every 6 (six) hours as needed for Nausea.    [DISCONTINUED] pantoprazole (PROTONIX) 20 MG tablet Take 1 tablet (20 mg total) by mouth once daily.     Family History     None        Tobacco Use    Smoking status: Never Smoker    Smokeless tobacco: Never Used   Substance and Sexual Activity    Alcohol use: Yes     Comment: socially    Drug use: No    Sexual activity:  Yes     Partners: Male     Birth control/protection: IUD     Review of Systems   Constitutional: Negative for appetite change, chills, diaphoresis and fever.   HENT: Negative for congestion, hearing loss, sore throat, tinnitus and trouble swallowing.    Eyes: Negative for photophobia, discharge, itching and visual disturbance.   Respiratory: Negative for apnea, cough, wheezing and stridor.    Cardiovascular: Positive for chest pain. Negative for palpitations and leg swelling.   Gastrointestinal: Negative for abdominal distention, abdominal pain, blood in stool, constipation, diarrhea and nausea.   Endocrine: Negative for polydipsia, polyphagia and polyuria.   Genitourinary: Negative for difficulty urinating, dysuria, flank pain and frequency.   Musculoskeletal: Negative for arthralgias, joint swelling and neck stiffness.   Skin: Negative for color change, rash and wound.   Neurological: Negative for dizziness, tremors, seizures, light-headedness, numbness and headaches.   Hematological: Negative for adenopathy.   Psychiatric/Behavioral: Negative for hallucinations and self-injury.     Objective:     Vital Signs (Most Recent):  Temp: 98.3 °F (36.8 °C) (05/11/20 0812)  Pulse: 76 (05/11/20 0812)  Resp: 20 (05/11/20 0812)  BP: (!) 154/70 (05/11/20 0812)  SpO2: 97 % (05/11/20 0812) Vital Signs (24h Range):  Temp:  [98.3 °F (36.8 °C)-99.2 °F (37.3 °C)] 98.3 °F (36.8 °C)  Pulse:  [76-99] 76  Resp:  [18-20] 20  SpO2:  [96 %-100 %] 97 %  BP: (115-163)/(59-98) 154/70     Weight: 93 kg (205 lb)  Body mass index is 37.49 kg/m².    Physical Exam   Constitutional: She appears well-developed and well-nourished. She is cooperative.   HENT:   Head: Normocephalic and atraumatic.   Eyes: Conjunctivae and lids are normal.   Neck: Full passive range of motion without pain. Neck supple. No JVD present. No edema present. No thyroid mass present.   Cardiovascular: Normal rate, S1 normal, S2 normal and intact distal pulses.   No murmur  heard.  Abdominal: Soft. Bowel sounds are normal. She exhibits no distension and no abdominal bruit. There is no splenomegaly or hepatomegaly. There is tenderness. There is guarding. There is no CVA tenderness.   Lymphadenopathy:     She has no cervical adenopathy.     She has no axillary adenopathy.   Neurological: She is alert. She has normal reflexes. She displays no tremor. She displays no seizure activity.   Skin: Skin is warm, dry and intact.   Psychiatric: She has a normal mood and affect. Her speech is normal. Thought content normal. Cognition and memory are normal.           Results for RADHA GILLIAM (MRN [id number]) as of 5/11/2020 10:19   Ref. Range 2/8/2011 08:44 9/13/2018 09:23   WBC Latest Ref Range: 4.8 - 10.8 K/uL 5.56    RBC Latest Ref Range: 4.00 - 5.40 M/uL 4.61    Hemoglobin Latest Ref Range: 12.0 - 16.0 gm/dl 13.2    Hematocrit Latest Ref Range: 37.0 - 48.5 % 40.5    MCV Latest Ref Range: 82 - 95 fL 87.9    MCH Latest Ref Range: 27 - 31 pg 28.6    MCHC Latest Ref Range: 32 - 36 % 32.6    RDW Latest Ref Range: 11.5 - 14.5 % 13.5    Platelets Latest Ref Range: 150 - 350 K/uL 297    MPV Latest Ref Range: 9.2 - 12.9 fL 10.7    Gran% Latest Ref Range: 38 - 73 % 54.9    Gran # (ANC) Latest Ref Range: 1.8 - 7.7 K/uL 3.1    Lymph% Latest Ref Range: 21 - 44 % 37.9    Lymph # Latest Ref Range: 1 - 4.8 K/uL 2.1    Mono% Latest Ref Range: 0.0 - 7.4 % 6.3    Mono # Latest Ref Range: 0.0 - 0.8 K/uL 0.4    Eosinophil% Latest Ref Range: 0.0 - 4.2 % 0.5    Eos # Latest Ref Range: 0 - 0.45 K/uL 0.0    Basophil% Latest Ref Range: 0 - 1.9 % 0.4    Baso # Latest Ref Range: 0.0 - 0.2 K/uL 0.0    Interpretation Unknown  NO EPITHELIAL ABNORMALITY SEE BELOW         AWAITING BMP    Significant Labs: Significant Imaging:   Imaging Results           CT Abdomen Pelvis  Without Contrast (Final result)  Result time 05/10/20 23:14:45    Final result by Saji Porras MD (05/10/20 23:14:45)                 Impression:      1.  Perinephric fluid and stranding on the left and minimally on the right.  This could represent pyelonephritis.  Recommend clinical correlation and follow-up.  See above comments.  2. Intrauterine device present in the lower uterine segment and cervix.  This is unchanged from the prior study.  Recommend GYN follow-up for better positioning.  3. 4.1 cm left ovarian cyst.  Recommend outpatient sonographic surveillance.  4. Chronic and postoperative changes.  See above comments.  5.  This report was flagged in Epic as abnormal.      Electronically signed by: Saji Joe  Date:    05/10/2020  Time:    23:14             Narrative:    EXAMINATION:  CT ABDOMEN PELVIS WITHOUT CONTRAST    CLINICAL HISTORY:  Abd pain, gastroenteritis or colitis suspected;    TECHNIQUE:  Low dose axial images, sagittal and coronal reformations were obtained from the lung bases to the pubic symphysis, Oral contrast was not administered.    COMPARISON:  07/09/2018    FINDINGS:  Heart: Normal in size. No pericardial effusion.    Lung Bases: Well aerated, without consolidation or pleural fluid.    Liver: Normal in size and attenuation, with no focal hepatic lesions.    Gallbladder: No calcified gallstones.    Bile Ducts: No evidence of dilated ducts.    Pancreas: No mass or peripancreatic fat stranding.    Spleen: Unremarkable.    Adrenals: Unremarkable.    Kidneys/ Ureters: No stones are detected bilaterally.  There is mild left perinephric fluid and stranding present.  Minimal stranding on the right also.  No mass is detected.  No hydronephrosis bilaterally.  An infectious/inflammatory process such as pyelonephritis is a consideration.  Recommend clinical correlation and follow-up.    Bladder: No evidence of wall thickening.    Reproductive organs: The uterus is present.  Intrauterine device is present.  The intrauterine device is situated in the lower uterine segment and cervix.  Recommend GYN follow-up for better positioning.    4.1 cm left  ovarian cyst.  Recommend outpatient sonographic surveillance.    GI Tract/Mesentery: No evidence of bowel obstruction or inflammation.    Postoperative changes of the stomach.    Peritoneal Space: No ascites. No free air.    Retroperitoneum: No significant adenopathy.    Abdominal wall: Small fat containing right anterior lateral abdominal wall hernia through the abdominal musculature on axial 32 possibly related to a prior surgical tract.  Minimal stranding in the left anterior abdomen subcutaneous fat.    Minimal fat containing umbilical hernia also.  These findings are unchanged.    Vasculature: No significant atherosclerosis or aneurysm.    Bones: No acute fracture.

## 2020-05-11 NOTE — CONSULTS
"                                         Renal Consult    Date of Admission:  5/10/2020  9:33 PM        Reason for  Consultation: BRIANNA      HPI: 39 y.o. AA female with PMHx significant for: GERD, s/p gastric sleeve (6 years ago) who presented to [facility] ED with complaint of 1 day history of Right CVA tenderness described as sharp-stabbing, intermittent with radiation to epigastric are associated with NV.  Pte. denied constipation or diarrhea, recent trauma,  trouble voiding or dysuria,  fever/chills, HA, CP, hematemesis, melena, hematochezia or  hemoptysis.   Initial workup in ED revealed a creatinine 2.6 (last creatinine on record on July 2018 was 1.1) pte. Was also Covid negative;   CT abd/pelvis  significant for "perinephric fluid and stranding on left, minimally on right; possible pyelonephritis"           PMH:  Past Medical History:   Diagnosis Date    GERD (gastroesophageal reflux disease)        PSH:  Past Surgical History:   Procedure Laterality Date    BREAST SURGERY      gastric sleeve         Allergies:  Review of patient's allergies indicates:   Allergen Reactions    Morphine Itching    Percocet [oxycodone-acetaminophen]        No current facility-administered medications on file prior to encounter.      Current Outpatient Medications on File Prior to Encounter   Medication Sig Dispense Refill    levonorgestrel (MIRENA) 20 mcg/24 hr (5 years) IUD 1 each by Intrauterine route once.      [DISCONTINUED] Bifidobacterium infantis (ALIGN) 4 mg Cap Take 1 capsule by mouth once daily. 10 capsule 0    [DISCONTINUED] dicyclomine (BENTYL) 20 mg tablet Take 1 tablet (20 mg total) by mouth every 8 (eight) hours as needed (abdominal cramps). 20 tablet 0    [DISCONTINUED] famotidine (PEPCID) 20 MG tablet Take 1 tablet (20 mg total) by mouth 2 (two) times daily. 20 tablet 0    [DISCONTINUED] fluticasone (FLONASE) 50 mcg/actuation nasal spray       [DISCONTINUED] HYDROcodone-acetaminophen (NORCO) 5-325 mg per " tablet Take 1 tablet by mouth every 4 (four) hours as needed. 15 tablet 0    [DISCONTINUED] hydrocodone-homatropine 5-1.5 mg/5 ml (HYCODAN) 5-1.5 mg/5 mL Syrp Take 5 mLs by mouth every 4 (four) hours as needed. 120 mL 0    [DISCONTINUED] ibuprofen (ADVIL,MOTRIN) 800 MG tablet Take 1 tablet (800 mg total) by mouth every 8 (eight) hours as needed for Pain. 30 tablet 0    [DISCONTINUED] lorazepam (ATIVAN) 2 MG Tab       [DISCONTINUED] meperidine (DEMEROL) 50 mg tablet       [DISCONTINUED] ondansetron (ZOFRAN) 4 MG tablet Take 1 tablet (4 mg total) by mouth every 6 (six) hours as needed for Nausea. 12 tablet 0    [DISCONTINUED] pantoprazole (PROTONIX) 20 MG tablet Take 1 tablet (20 mg total) by mouth once daily. 30 tablet 0       Medications:  Current Facility-Administered Medications   Medication Dose Route Frequency Provider Last Rate Last Dose    0.9%  NaCl infusion   Intravenous Continuous Ngozi VJesusita Feliciano, RANP 100 mL/hr at 05/11/20 1302      ciprofloxacin HCl tablet 500 mg  500 mg Oral Q12H Ngozi ANUP Feliciano, FNP        diphenhydrAMINE injection 6.25 mg  6.25 mg Intravenous Q6H PRN Ngozi VJesusita Feliciano, FNP   6.25 mg at 05/11/20 1302    enoxaparin injection 40 mg  40 mg Subcutaneous Daily Ngozi VJesusita Feliciano, FNP        ketorolac injection 15 mg  15 mg Intravenous Q6H PRN Ngozi VJesusita Feliciano, FNP   15 mg at 05/11/20 1040    oxyCODONE-acetaminophen 5-325 mg per tablet 1 tablet  1 tablet Oral Q6H PRN Ngozi VJesusita Feliciano, FNP   1 tablet at 05/11/20 1302       FamHx:  Family History   Problem Relation Age of Onset    Breast cancer Neg Hx     Colon cancer Neg Hx     Ovarian cancer Neg Hx        SocHx:  Social History     Socioeconomic History    Marital status:      Spouse name: Not on file    Number of children: Not on file    Years of education: Not on file    Highest education level: Not on file   Occupational History    Not on file   Social Needs    Financial resource strain: Not on file    Food  insecurity:     Worry: Not on file     Inability: Not on file    Transportation needs:     Medical: Not on file     Non-medical: Not on file   Tobacco Use    Smoking status: Never Smoker    Smokeless tobacco: Never Used   Substance and Sexual Activity    Alcohol use: Yes     Comment: socially    Drug use: No    Sexual activity: Yes     Partners: Male     Birth control/protection: IUD   Lifestyle    Physical activity:     Days per week: Not on file     Minutes per session: Not on file    Stress: Not on file   Relationships    Social connections:     Talks on phone: Not on file     Gets together: Not on file     Attends Cheondo[employer] service: Not on file     Active member of club or organization: Not on file     Attends meetings of clubs or organizations: Not on file     Relationship status: Not on file   Other Topics Concern    Not on file   Social History Narrative    Not on file           Review of Systems: see H&P       Physical Exam:  Vitals:   Vitals:    05/11/20 1112   BP: 130/70   Pulse: 78   Resp: 19   Temp: 97.9 °F (36.6 °C)       No intake/output data recorded.  I/O this shift:  In: -   Out: 700 [Urine:700]    General: No apparent distress.   Neck: supple   Lungs: Unlabored breathing  Heart: RRR  Abdomen: n/a  Ext: n/a  Neurologic: Awake and alert, oriented x 3      Laboratories:    No results for input(s): WBC, RBC, HGB, HCT, PLT, MCV, MCH, MCHC in the last 24 hours.    Recent Labs   Lab 05/11/20  0919   CALCIUM 8.6*      K 4.3   CO2 22*      BUN 13   CREATININE 3.2*       Recent Labs   Lab 05/10/20  2140 05/11/20  0900   COLORU Light yellow Straw   CLARITYU Slightly Cloudy  --    SPECGRAV 1.020 1.005   PHUR  --  6.0   PROTEINUA  --  2+*   BACTERIA  --  Few*       Microbiology Results (last 7 days)     Procedure Component Value Units Date/Time    Urine culture **CANNOT BE ORDERED STAT** [[id number]] Collected:  05/11/20 0027    Order Status:  Sent Specimen:  Urine, Clean Catch Updated:   05/11/20 0635            Diagnostic Tests:    CXR:    FINDINGS:  Heart size is not enlarged.  There is no increase in pulmonary vascularity or pleural effusion.  The osseous structures are intact.  Lungs demonstrate no consolidation.   Impression:       No finding to correlate with the clinical history      Electronically signed by: Suzie Perez MD     CT Abdomen-pelvis  Impression:       1. Perinephric fluid and stranding on the left and minimally on the right.  This could represent pyelonephritis.  Recommend clinical correlation and follow-up.  See above comments.  2. Intrauterine device present in the lower uterine segment and cervix.  This is unchanged from the prior study.  Recommend GYN follow-up for better positioning.  3. 4.1 cm left ovarian cyst.  Recommend outpatient sonographic surveillance.  4. Chronic and postoperative changes.  See above comments.  5.  This report was flagged in Epic as abnormal.      Electronically signed by: Saji Joe  Date: 05/10/2020           Assessment:    38 y/o female admitted with:    - Flank pain r/o Pyelonephritis  - BRIANNA based on last creatinine value  - Proteinuria estimated at > 2 grams per day      Plan:    - IVFs to 0.45% saline  - STOP Toradol (NSAID)   - Empiric antibiotics (no urine culture available)  - Obtain a CBC (not done on admission  - Bladder scan if not done  - f/u BMP

## 2020-05-11 NOTE — NURSING
Patient arrived on the unit via stretcher with transport.  No report was called.  Patient was placed in room and oriented. Information was placed on the whiteboard, and chart reviewed.  Bed in low position, wheels locked and call bell within reach.

## 2020-05-11 NOTE — ED NOTES
Spoke with House Supervisor and informed her that the  has not arrived yet to  COVID. Will keep her updated

## 2020-05-11 NOTE — DISCHARGE INSTRUCTIONS
WRITTEN HEALTHCARE DISCHARGE INFORMATION      Things that YOU are responsible for to Manage Your Care At Home:  1. Getting your prescriptions filled.  2. Taking you medications as directed. DO NOT MISS ANY DOSES!  3. Going to your follow-up doctor appointments. This is important because it allows the doctor to monitor your progress and to determine if any changes need to be made to your treatment plan.     If you are unable to make your follow up appointments, please call the number listed and reschedule this appointment.      After discharge, if you need assistance, you can call [facility] On Call Nurse Care Line for 24/7 assistance at [phone]     If you are experience any signs or symptoms, Call your doctor or Call [phone] and come to your nearest Emergency Room.     Thank you for choosing [facility] and allowing us to care for you.        You should receive a call from [facility] Discharge Department within 48-72 hours to help manage your care after discharge. Please try to make sure that you answer your phone for this important phone call.

## 2020-05-11 NOTE — H&P
"[facility] Medicine  History & Physical    Patient Name: Nando Fong  MRN: [id number]  Admission Date: 5/10/2020  Attending Physician: Donna Moreno MD   Primary Care Provider: To Obtain Unable         Patient information was obtained from patient and ER records.     Subjective:     Principal Problem:Acute pyelonephritis    Chief Complaint:   Chief Complaint   Patient presents with    Back Pain     Pt c/o back pain, lower abdominal pain and vomiting since earlier today. reports taking tylenol around 3pm and pepto bismol 1 hour PTA but denies relief    Abdominal Pain     lower abdominal pain that began earlier today with intermittent nausea and vomiting. denies diarrhea    Vomiting        HPI: Nando Fong 39 y.o. AA female with PMHx:GERD and gastric sleeve (about 6 years ago) who presented with complaint of 1 day history of Right CVA tenderness (7/10), with associated NV, one episode (NBNB). Reports radiating pain to upper epigastric area, sharp intermittent stabbing. She denies constipation or diarrhea, recent trauma. She denies trouble voiding and fever/chills, HA, CP, recent trauma, melana, hematochezia, hemoptysis. She denies fever/chills, melena or hematemesis. Denies dysuria or voiding difficulty.    Initial workup includes [id number]; creatinine=2.6 and AST,urinalysis unrevealing; Covid negative; CT abd/pelis significant for, "perinephric fluid and stranding on left,minimally on right; possible pyelonephritis" as well as small hernia.            Past Medical History:   Diagnosis Date    GERD (gastroesophageal reflux disease)        Past Surgical History:   Procedure Laterality Date    BREAST SURGERY      gastric sleeve         Review of patient's allergies indicates:   Allergen Reactions    Morphine Itching    Percocet [oxycodone-acetaminophen]        No current facility-administered medications on file prior to encounter.      Current Outpatient Medications on " File Prior to Encounter   Medication Sig    levonorgestrel (MIRENA) 20 mcg/24 hr (5 years) IUD 1 each by Intrauterine route once.    [DISCONTINUED] Bifidobacterium infantis (ALIGN) 4 mg Cap Take 1 capsule by mouth once daily.    [DISCONTINUED] dicyclomine (BENTYL) 20 mg tablet Take 1 tablet (20 mg total) by mouth every 8 (eight) hours as needed (abdominal cramps).    [DISCONTINUED] famotidine (PEPCID) 20 MG tablet Take 1 tablet (20 mg total) by mouth 2 (two) times daily.    [DISCONTINUED] fluticasone (FLONASE) 50 mcg/actuation nasal spray     [DISCONTINUED] HYDROcodone-acetaminophen (NORCO) 5-325 mg per tablet Take 1 tablet by mouth every 4 (four) hours as needed.    [DISCONTINUED] hydrocodone-homatropine 5-1.5 mg/5 ml (HYCODAN) 5-1.5 mg/5 mL Syrp Take 5 mLs by mouth every 4 (four) hours as needed.    [DISCONTINUED] ibuprofen (ADVIL,MOTRIN) 800 MG tablet Take 1 tablet (800 mg total) by mouth every 8 (eight) hours as needed for Pain.    [DISCONTINUED] lorazepam (ATIVAN) 2 MG Tab     [DISCONTINUED] meperidine (DEMEROL) 50 mg tablet     [DISCONTINUED] ondansetron (ZOFRAN) 4 MG tablet Take 1 tablet (4 mg total) by mouth every 6 (six) hours as needed for Nausea.    [DISCONTINUED] pantoprazole (PROTONIX) 20 MG tablet Take 1 tablet (20 mg total) by mouth once daily.     Family History     None        Tobacco Use    Smoking status: Never Smoker    Smokeless tobacco: Never Used   Substance and Sexual Activity    Alcohol use: Yes     Comment: socially    Drug use: No    Sexual activity: Yes     Partners: Male     Birth control/protection: IUD     Review of Systems   Constitutional: Negative for appetite change, chills, diaphoresis and fever.   HENT: Negative for congestion, hearing loss, sore throat, tinnitus and trouble swallowing.    Eyes: Negative for photophobia, discharge, itching and visual disturbance.   Respiratory: Negative for apnea, cough, wheezing and stridor.    Cardiovascular: Positive for chest  pain. Negative for palpitations and leg swelling.   Gastrointestinal: Negative for abdominal distention, abdominal pain, blood in stool, constipation, diarrhea and nausea.   Endocrine: Negative for polydipsia, polyphagia and polyuria.   Genitourinary: Negative for difficulty urinating, dysuria, flank pain and frequency.   Musculoskeletal: Negative for arthralgias, joint swelling and neck stiffness.   Skin: Negative for color change, rash and wound.   Neurological: Negative for dizziness, tremors, seizures, light-headedness, numbness and headaches.   Hematological: Negative for adenopathy.   Psychiatric/Behavioral: Negative for hallucinations and self-injury.     Objective:     Vital Signs (Most Recent):  Temp: 98.3 °F (36.8 °C) (05/11/20 0812)  Pulse: 76 (05/11/20 0812)  Resp: 20 (05/11/20 0812)  BP: (!) 154/70 (05/11/20 0812)  SpO2: 97 % (05/11/20 0812) Vital Signs (24h Range):  Temp:  [98.3 °F (36.8 °C)-99.2 °F (37.3 °C)] 98.3 °F (36.8 °C)  Pulse:  [76-99] 76  Resp:  [18-20] 20  SpO2:  [96 %-100 %] 97 %  BP: (115-163)/(59-98) 154/70     Weight: 93 kg (205 lb)  Body mass index is 37.49 kg/m².    Physical Exam   Constitutional: She appears well-developed and well-nourished. She is cooperative.   HENT:   Head: Normocephalic and atraumatic.   Eyes: Conjunctivae and lids are normal.   Neck: Full passive range of motion without pain. Neck supple. No JVD present. No edema present. No thyroid mass present.   Cardiovascular: Normal rate, S1 normal, S2 normal and intact distal pulses.   No murmur heard.  Abdominal: Soft. Bowel sounds are normal. She exhibits no distension and no abdominal bruit. There is no splenomegaly or hepatomegaly. There is tenderness. There is guarding. There is no CVA tenderness.   Lymphadenopathy:     She has no cervical adenopathy.     She has no axillary adenopathy.   Neurological: She is alert. She has normal reflexes. She displays no tremor. She displays no seizure activity.   Skin: Skin is  warm, dry and intact.   Psychiatric: She has a normal mood and affect. Her speech is normal. Thought content normal. Cognition and memory are normal.           Results for RADHA GILLIAM (MRN [id number]) as of 5/11/2020 10:19   Ref. Range 2/8/2011 08:44 9/13/2018 09:23   WBC Latest Ref Range: 4.8 - 10.8 K/uL 5.56    RBC Latest Ref Range: 4.00 - 5.40 M/uL 4.61    Hemoglobin Latest Ref Range: 12.0 - 16.0 gm/dl 13.2    Hematocrit Latest Ref Range: 37.0 - 48.5 % 40.5    MCV Latest Ref Range: 82 - 95 fL 87.9    MCH Latest Ref Range: 27 - 31 pg 28.6    MCHC Latest Ref Range: 32 - 36 % 32.6    RDW Latest Ref Range: 11.5 - 14.5 % 13.5    Platelets Latest Ref Range: 150 - 350 K/uL 297    MPV Latest Ref Range: 9.2 - 12.9 fL 10.7    Gran% Latest Ref Range: 38 - 73 % 54.9    Gran # (ANC) Latest Ref Range: 1.8 - 7.7 K/uL 3.1    Lymph% Latest Ref Range: 21 - 44 % 37.9    Lymph # Latest Ref Range: 1 - 4.8 K/uL 2.1    Mono% Latest Ref Range: 0.0 - 7.4 % 6.3    Mono # Latest Ref Range: 0.0 - 0.8 K/uL 0.4    Eosinophil% Latest Ref Range: 0.0 - 4.2 % 0.5    Eos # Latest Ref Range: 0 - 0.45 K/uL 0.0    Basophil% Latest Ref Range: 0 - 1.9 % 0.4    Baso # Latest Ref Range: 0.0 - 0.2 K/uL 0.0    Interpretation Unknown  NO EPITHELIAL ABNORMALITY SEE BELOW         AWAITING BMP    Significant Labs: Significant Imaging:   Imaging Results           CT Abdomen Pelvis  Without Contrast (Final result)  Result time 05/10/20 23:14:45    Final result by Saji Porras MD (05/10/20 23:14:45)                 Impression:      1. Perinephric fluid and stranding on the left and minimally on the right.  This could represent pyelonephritis.  Recommend clinical correlation and follow-up.  See above comments.  2. Intrauterine device present in the lower uterine segment and cervix.  This is unchanged from the prior study.  Recommend GYN follow-up for better positioning.  3. 4.1 cm left ovarian cyst.  Recommend outpatient sonographic surveillance.  4. Chronic  and postoperative changes.  See above comments.  5.  This report was flagged in Epic as abnormal.      Electronically signed by: Saji Joe  Date:    05/10/2020  Time:    23:14             Narrative:    EXAMINATION:  CT ABDOMEN PELVIS WITHOUT CONTRAST    CLINICAL HISTORY:  Abd pain, gastroenteritis or colitis suspected;    TECHNIQUE:  Low dose axial images, sagittal and coronal reformations were obtained from the lung bases to the pubic symphysis, Oral contrast was not administered.    COMPARISON:  07/09/2018    FINDINGS:  Heart: Normal in size. No pericardial effusion.    Lung Bases: Well aerated, without consolidation or pleural fluid.    Liver: Normal in size and attenuation, with no focal hepatic lesions.    Gallbladder: No calcified gallstones.    Bile Ducts: No evidence of dilated ducts.    Pancreas: No mass or peripancreatic fat stranding.    Spleen: Unremarkable.    Adrenals: Unremarkable.    Kidneys/ Ureters: No stones are detected bilaterally.  There is mild left perinephric fluid and stranding present.  Minimal stranding on the right also.  No mass is detected.  No hydronephrosis bilaterally.  An infectious/inflammatory process such as pyelonephritis is a consideration.  Recommend clinical correlation and follow-up.    Bladder: No evidence of wall thickening.    Reproductive organs: The uterus is present.  Intrauterine device is present.  The intrauterine device is situated in the lower uterine segment and cervix.  Recommend GYN follow-up for better positioning.    4.1 cm left ovarian cyst.  Recommend outpatient sonographic surveillance.    GI Tract/Mesentery: No evidence of bowel obstruction or inflammation.    Postoperative changes of the stomach.    Peritoneal Space: No ascites. No free air.    Retroperitoneum: No significant adenopathy.    Abdominal wall: Small fat containing right anterior lateral abdominal wall hernia through the abdominal musculature on axial 32 possibly related to a prior  surgical tract.  Minimal stranding in the left anterior abdomen subcutaneous fat.    Minimal fat containing umbilical hernia also.  These findings are unchanged.    Vasculature: No significant atherosclerosis or aneurysm.    Bones: No acute fracture.                                  Assessment/Plan:     * Acute pyelonephritis  Patient denies trouble voiding or dysuria however complains of CVA tenderness and pain that radiates to her mid epigastric area that sharp and stabbing at times; CT abdomen pelvis significant for finding suggestive of pyelonephritis with some Emeli-nephritic stranding patient also has an intrauterine device.  She had low-grade temp and I 9.2 at the time of presentation as well as intractable pain.  Continues to complain of pain 7/10  -start IV antibiotics  -supportive care  -IV fluids  -repeat UA and culture  -as urine sodium protein  -consult nephrology for CKD opinion  -consider renal ultrasound will defer to Nephrology      Acute renal failure with acute renal cortical necrosis superimposed on stage 4 chronic kidney disease  No known history of renal failure patient presents with CT scan suggestive of pyelonephritis with low-grade temp, CV a pain and tenderness, and presenting creatinine = 2.6 improve slightly with IV fluids = 2.4 this morning creatinine back up to 3.2.    Estimated Creatinine Clearance: 24.8 mL/min (A) (based on SCr of 3.2 mg/dL (H)).  -start IV fluids at 100 cc an hour  -monitor chemistry closely  -urine studies  -renal US  -consult to Nephrology as above  -strict I&Os    Results for RADHA GILLIAM (MRN [id number]) as of 5/11/2020 11:09   Ref. Range 5/11/2020 09:19   Sodium Latest Ref Range: 136 - 145 mmol/L 137   Potassium Latest Ref Range: 3.5 - 5.1 mmol/L 4.3   Chloride Latest Ref Range: 95 - 110 mmol/L 106   CO2 Latest Ref Range: 23 - 29 mmol/L 22 (L)   Anion Gap Latest Ref Range: 8 - 16 mmol/L 9   BUN, Bld Latest Ref Range: 6 - 20 mg/dL 13   Creatinine Latest Ref  Range: 0.5 - 1.4 mg/dL 3.2 (H)   eGFR if non African American Latest Ref Range: >60 mL/min/1.73 m^2 17 (A)   eGFR if African American Latest Ref Range: >60 mL/min/1.73 m^2 20 (A)   Glucose Latest Ref Range: 70 - 110 mg/dL 117 (H)   Calcium Latest Ref Range: 8.7 - 10.5 mg/dL 8.6 (L)       Periumbilical abdominal pain  Supportive care---try Toradol x3 doses q.8 hours p.r.n. Pain  Breakthrough  -Allergy to percocet      Hypernatremia  Urine sodium = 150 at the time of presentation but have self corrected now her sodium is 137  -monitor sodium closely along with other electrolytes  -locate infuse IV normal saline at this time monitor closely  -Renal US      VTE Risk Mitigation (From admission, onward)         Ordered     enoxaparin injection 40 mg  Daily      05/11/20 1025     IP VTE HIGH RISK PATIENT  Once      05/11/20 0802                 LUCIANA Grey, FNP-C  Hospitalist - Department of Hospital Medicine  [address] [place] [address]  Office [phone]; Pager [phone]

## 2020-05-11 NOTE — ED NOTES
Pt states itching has subsided, pt is in no distress. Respirations are even and unlabored. VSS. Will continue to monitor closely.

## 2020-05-11 NOTE — HPI
"Nando Fong 39 y.o. AA female with PMHx:GERD and gastric sleeve (about 6 years ago) who presented with complaint of 1 day history of Right CVA tenderness (7/10), with associated NV, one episode (NBNB). Reports radiating pain to upper epigastric area, sharp intermittent stabbing. She denies constipation or diarrhea, recent trauma. She denies trouble voiding and fever/chills, HA, CP, recent trauma, melana, hematochezia, hemoptysis. She denies fever/chills, melena or hematemesis. Denies dysuria or voiding difficulty.    Initial workup includes [id number]; creatinine=2.6 and AST,urinalysis unrevealing; Covid negative; CT abd/pelis significant for, "perinephric fluid and stranding on left,minimally on right; possible pyelonephritis" as well as small hernia.          "

## 2020-05-11 NOTE — ED NOTES
Pt resting w/ eyes closed, is easily arousable and states she is still in pain. MD notified. Will continue to monitor closely.

## 2020-05-11 NOTE — PLAN OF CARE
Problem: Adult Inpatient Plan of Care  Goal: Plan of Care Review  Outcome: Ongoing, Progressing  Goal: Patient-Specific Goal (Individualization)  Outcome: Ongoing, Progressing  Goal: Absence of Hospital-Acquired Illness or Injury  Outcome: Ongoing, Progressing  Intervention: Identify and Manage Fall Risk  Flowsheets (Taken 5/11/2020 1759)  Safety Promotion/Fall Prevention: assistive device/personal item within reach; side rails raised x 2; nonskid shoes/socks when out of bed; medications reviewed; Fall Risk reviewed with patient/family  Intervention: Prevent VTE (venous thromboembolism)  Flowsheets (Taken 5/11/2020 1759)  VTE Prevention/Management: fluids promoted; intravenous hydration; ambulation promoted  Goal: Optimal Comfort and Wellbeing  Outcome: Ongoing, Progressing  Intervention: Provide Person-Centered Care  Flowsheets (Taken 5/11/2020 1759)  Trust Relationship/Rapport: care explained; questions answered; questions encouraged  Goal: Readiness for Transition of Care  Outcome: Ongoing, Progressing  Goal: Rounds/Family Conference  Outcome: Ongoing, Progressing     Problem: Electrolyte Imbalance (Acute Kidney Injury/Impairment)  Goal: Serum Electrolyte Balance  Outcome: Ongoing, Progressing     Problem: Fluid Imbalance (Acute Kidney Injury/Impairment)  Goal: Optimal Fluid Balance  Outcome: Ongoing, Progressing     Problem: Hematologic Alteration (Acute Kidney Injury/Impairment)  Goal: Hemoglobin, Hematocrit and Platelets Within Normal Range  Outcome: Ongoing, Progressing     Problem: Oral Intake Inadequate (Acute Kidney Injury/Impairment)  Goal: Optimal Nutrition Intake  Outcome: Ongoing, Progressing     Problem: Renal Function Impairment (Acute Kidney Injury/Impairment)  Goal: Effective Renal Function  Outcome: Ongoing, Progressing  Intervention: Monitor and Support Renal Function  Flowsheets (Taken 5/11/2020 1759)  Medication Review/Management: medications reviewed

## 2020-05-11 NOTE — CARE UPDATE
Patient requesting pain medication other than the toradol IV that is ordered. Reports allergy to percocet states it makes her     Requesting morphine, demerol or dilaudid-- Estimated Creatinine Clearance: 24.8 mL/min (A) (based on SCr of 3.2 mg/dL (H)). NSAIDs contraindicated - will continue Toradol for now and can transition to tramadol/percocet if amenable.      Vitals:    05/11/20 1112   BP: 130/70   Pulse: 78   Resp: 19   Temp: 97.9 °F (36.6 °C)             LUCIANA Grey, FNP-C  Hospitalist - Department of Hospital Medicine  [address], [place] [address]  Office [phone]; Pager [phone]

## 2020-05-11 NOTE — NURSING
Patient is A&Ox4 with peripheral IV in L arm, and makes needs known. Patient remained free from falls during this shift. Patient has been getting IV antibiotics, and IV fluids.  Patient is on room air. VSS. Patient given PRN pain medication upon request. No acute distress noted at this time. Bed in low position, call bell within reach. Patient ambulates to the bathroom independently.

## 2020-05-11 NOTE — ED NOTES
Pt c/o periumbilical abd pain radiating to the lower back w/ N/V x 2 hrs. Pt states she had the same c/c 1 yr ago. Pt states no change in appetite or bowel habits. Pt is A & O x 3, denies SOB, fever, chills, cough and diarrhea. Skin is warm, dry and pink.

## 2020-05-11 NOTE — ED NOTES
Pt A & O x 3, states pain remains 10/10. No respiratory distress observed. VSS. Will continue to monitor closely.

## 2020-05-11 NOTE — ASSESSMENT & PLAN NOTE
Urine sodium = 150 at the time of presentation but have self corrected now her sodium is 137  -monitor sodium closely along with other electrolytes  -locate infuse IV normal saline at this time monitor closely  -Renal US

## 2020-05-11 NOTE — ASSESSMENT & PLAN NOTE
Supportive care---try Toradol x3 doses q.8 hours p.r.n. Pain  Breakthrough  -Allergy to percocet     cranial nerves 2-12 intact

## 2020-05-11 NOTE — PLAN OF CARE
05/11/20 1222   Discharge Assessment   Assessment Type Discharge Planning Assessment   Assessment information obtained from? Medical Record   Prior to hospitilization cognitive status: Alert/Oriented   Prior to hospitalization functional status: Independent   Current cognitive status: Alert/Oriented   Current Functional Status: Independent   Facility Arrived From: home   Lives With other (see comments)   Able to Return to Prior Arrangements yes   Is patient able to care for self after discharge? Yes   Who are your caregiver(s) and their phone number(s)? [facility]- [phone]   Patient's perception of discharge disposition home or selfcare   Readmission Within the Last 30 Days no previous admission in last 30 days   Patient currently being followed by outpatient case management? No   Patient currently receives any other outside agency services? No   Equipment Currently Used at Home none   Do you have any problems affording any of your prescribed medications? No   Is the patient taking medications as prescribed? yes   Does the patient have transportation home? Yes   Transportation Anticipated family or friend will provide   Does the patient receive services at the Coumadin Clinic? No   Discharge Plan A Home  (with follow up )   DME Needed Upon Discharge  none   Patient/Family in Agreement with Plan yes     [facility] - [place] - [address]  [address]  [place] [address]  Phone: [phone] Fax: [phone]

## 2020-05-11 NOTE — ASSESSMENT & PLAN NOTE
Patient denies trouble voiding or dysuria however complains of CVA tenderness and pain that radiates to her mid epigastric area that sharp and stabbing at times; CT abdomen pelvis significant for finding suggestive of pyelonephritis with some Emeli-nephritic stranding patient also has an intrauterine device.  She had low-grade temp and I 9.2 at the time of presentation as well as intractable pain.  Continues to complain of pain 7/10  -start IV antibiotics  -supportive care  -IV fluids  -repeat UA and culture  -as urine sodium protein  -consult nephrology for CKD opinion  -consider renal ultrasound will defer to Nephrology

## 2020-05-11 NOTE — ASSESSMENT & PLAN NOTE
No known history of renal failure patient presents with CT scan suggestive of pyelonephritis with low-grade temp, CV a pain and tenderness, and presenting creatinine = 2.6 improve slightly with IV fluids = 2.4 this morning creatinine back up to 3.2.    Estimated Creatinine Clearance: 24.8 mL/min (A) (based on SCr of 3.2 mg/dL (H)).  -start IV fluids at 100 cc an hour  -monitor chemistry closely  -urine studies  -renal US,show no obstruction.  -consult to Nephrology as above  -strict I&Os  Nephrology is consulted for BRIANNA,on IVF with no improvement,has urinary retention,in and out cath as needed.BHASKAR show no obstruction.    Results for RADHA GILLIAM (MRN [id number]) as of 5/11/2020 11:09   Ref. Range 5/11/2020 09:19   Sodium Latest Ref Range: 136 - 145 mmol/L 137   Potassium Latest Ref Range: 3.5 - 5.1 mmol/L 4.3   Chloride Latest Ref Range: 95 - 110 mmol/L 106   CO2 Latest Ref Range: 23 - 29 mmol/L 22 (L)   Anion Gap Latest Ref Range: 8 - 16 mmol/L 9   BUN, Bld Latest Ref Range: 6 - 20 mg/dL 13   Creatinine Latest Ref Range: 0.5 - 1.4 mg/dL 3.2 (H)   eGFR if non African American Latest Ref Range: >60 mL/min/1.73 m^2 17 (A)   eGFR if African American Latest Ref Range: >60 mL/min/1.73 m^2 20 (A)   Glucose Latest Ref Range: 70 - 110 mg/dL 117 (H)   Calcium Latest Ref Range: 8.7 - 10.5 mg/dL 8.6 (L)

## 2020-05-11 NOTE — ED NOTES
Pt A & O x 3, states she is still a little nauseous, not much though. Respirations are even and unlabored. Will continue to monitor closely.

## 2020-05-11 NOTE — ASSESSMENT & PLAN NOTE
No known history of renal failure patient presents with CT scan suggestive of pyelonephritis with low-grade temp, CV a pain and tenderness, and presenting creatinine = 2.6 improve slightly with IV fluids = 2.4 this morning creatinine back up to 3.2.    Estimated Creatinine Clearance: 24.8 mL/min (A) (based on SCr of 3.2 mg/dL (H)).  -start IV fluids at 100 cc an hour  -monitor chemistry closely  -urine studies  -renal US  -consult to Nephrology as above  -strict I&Os    Results for RADHA GILLIAM (MRN [id number]) as of 5/11/2020 11:09   Ref. Range 5/11/2020 09:19   Sodium Latest Ref Range: 136 - 145 mmol/L 137   Potassium Latest Ref Range: 3.5 - 5.1 mmol/L 4.3   Chloride Latest Ref Range: 95 - 110 mmol/L 106   CO2 Latest Ref Range: 23 - 29 mmol/L 22 (L)   Anion Gap Latest Ref Range: 8 - 16 mmol/L 9   BUN, Bld Latest Ref Range: 6 - 20 mg/dL 13   Creatinine Latest Ref Range: 0.5 - 1.4 mg/dL 3.2 (H)   eGFR if non African American Latest Ref Range: >60 mL/min/1.73 m^2 17 (A)   eGFR if African American Latest Ref Range: >60 mL/min/1.73 m^2 20 (A)   Glucose Latest Ref Range: 70 - 110 mg/dL 117 (H)   Calcium Latest Ref Range: 8.7 - 10.5 mg/dL 8.6 (L)

## 2020-05-12 PROBLEM — R33.9 URINARY RETENTION: Status: ACTIVE | Noted: 2020-05-12

## 2020-05-12 LAB
ALBUMIN SERPL BCP-MCNC: 2.7 G/DL (ref 3.5–5.2)
ALP SERPL-CCNC: 90 U/L (ref 55–135)
ALT SERPL W/O P-5'-P-CCNC: 16 U/L (ref 10–44)
ANION GAP SERPL CALC-SCNC: 11 MMOL/L (ref 8–16)
AST SERPL-CCNC: 17 U/L (ref 10–40)
BASOPHILS # BLD AUTO: 0.01 K/UL (ref 0–0.2)
BASOPHILS NFR BLD: 0.1 % (ref 0–1.9)
BILIRUB SERPL-MCNC: 0.2 MG/DL (ref 0.1–1)
BUN SERPL-MCNC: 24 MG/DL (ref 6–20)
CALCIUM SERPL-MCNC: 8.8 MG/DL (ref 8.7–10.5)
CHLORIDE SERPL-SCNC: 109 MMOL/L (ref 95–110)
CO2 SERPL-SCNC: 19 MMOL/L (ref 23–29)
CREAT SERPL-MCNC: 3.4 MG/DL (ref 0.5–1.4)
DIFFERENTIAL METHOD: ABNORMAL
EOSINOPHIL # BLD AUTO: 0 K/UL (ref 0–0.5)
EOSINOPHIL NFR BLD: 0 % (ref 0–8)
ERYTHROCYTE [DISTWIDTH] IN BLOOD BY AUTOMATED COUNT: 11.9 % (ref 11.5–14.5)
EST. GFR  (AFRICAN AMERICAN): 19 ML/MIN/1.73 M^2
EST. GFR  (NON AFRICAN AMERICAN): 16 ML/MIN/1.73 M^2
GLUCOSE SERPL-MCNC: 184 MG/DL (ref 70–110)
HCT VFR BLD AUTO: 35 % (ref 37–48.5)
HGB BLD-MCNC: 11.6 G/DL (ref 12–16)
IMM GRANULOCYTES # BLD AUTO: 0.12 K/UL (ref 0–0.04)
IMM GRANULOCYTES NFR BLD AUTO: 0.7 % (ref 0–0.5)
LYMPHOCYTES # BLD AUTO: 0.6 K/UL (ref 1–4.8)
LYMPHOCYTES NFR BLD: 3.5 % (ref 18–48)
MAGNESIUM SERPL-MCNC: 2 MG/DL (ref 1.6–2.6)
MCH RBC QN AUTO: 34.9 PG (ref 27–31)
MCHC RBC AUTO-ENTMCNC: 33.1 G/DL (ref 32–36)
MCV RBC AUTO: 105 FL (ref 82–98)
MONOCYTES # BLD AUTO: 0.3 K/UL (ref 0.3–1)
MONOCYTES NFR BLD: 1.6 % (ref 4–15)
NEUTROPHILS # BLD AUTO: 15.6 K/UL (ref 1.8–7.7)
NEUTROPHILS NFR BLD: 94.1 % (ref 38–73)
NRBC BLD-RTO: 0 /100 WBC
PLATELET # BLD AUTO: 242 K/UL (ref 150–350)
PMV BLD AUTO: 10.4 FL (ref 9.2–12.9)
POTASSIUM SERPL-SCNC: 4.7 MMOL/L (ref 3.5–5.1)
PROT SERPL-MCNC: 6.6 G/DL (ref 6–8.4)
RBC # BLD AUTO: 3.32 M/UL (ref 4–5.4)
SODIUM SERPL-SCNC: 139 MMOL/L (ref 136–145)
WBC # BLD AUTO: 16.62 K/UL (ref 3.9–12.7)

## 2020-05-12 PROCEDURE — 51798 US URINE CAPACITY MEASURE: CPT

## 2020-05-12 PROCEDURE — 25000003 PHARM REV CODE 250: Performed by: NURSE PRACTITIONER

## 2020-05-12 PROCEDURE — 63600175 PHARM REV CODE 636 W HCPCS: Performed by: NURSE PRACTITIONER

## 2020-05-12 PROCEDURE — 85025 COMPLETE CBC W/AUTO DIFF WBC: CPT

## 2020-05-12 PROCEDURE — 25000003 PHARM REV CODE 250: Performed by: HOSPITALIST

## 2020-05-12 PROCEDURE — 83970 ASSAY OF PARATHORMONE: CPT

## 2020-05-12 PROCEDURE — 11000001 HC ACUTE MED/SURG PRIVATE ROOM

## 2020-05-12 PROCEDURE — 63600175 PHARM REV CODE 636 W HCPCS: Performed by: HOSPITALIST

## 2020-05-12 PROCEDURE — 36415 COLL VENOUS BLD VENIPUNCTURE: CPT

## 2020-05-12 PROCEDURE — 83735 ASSAY OF MAGNESIUM: CPT

## 2020-05-12 PROCEDURE — 80053 COMPREHEN METABOLIC PANEL: CPT

## 2020-05-12 RX ORDER — SODIUM CHLORIDE 450 MG/100ML
INJECTION, SOLUTION INTRAVENOUS CONTINUOUS
Status: DISCONTINUED | OUTPATIENT
Start: 2020-05-12 | End: 2020-05-13

## 2020-05-12 RX ORDER — POLYETHYLENE GLYCOL 3350 17 G/17G
17 POWDER, FOR SOLUTION ORAL DAILY
Status: DISCONTINUED | OUTPATIENT
Start: 2020-05-12 | End: 2020-05-14 | Stop reason: HOSPADM

## 2020-05-12 RX ORDER — CALCIUM CARBONATE 200(500)MG
500 TABLET,CHEWABLE ORAL 3 TIMES DAILY PRN
Status: DISCONTINUED | OUTPATIENT
Start: 2020-05-12 | End: 2020-05-14 | Stop reason: HOSPADM

## 2020-05-12 RX ADMIN — ENOXAPARIN SODIUM 40 MG: 100 INJECTION SUBCUTANEOUS at 04:05

## 2020-05-12 RX ADMIN — SODIUM CHLORIDE: 0.45 INJECTION, SOLUTION INTRAVENOUS at 08:05

## 2020-05-12 RX ADMIN — SODIUM CHLORIDE: 0.45 INJECTION, SOLUTION INTRAVENOUS at 11:05

## 2020-05-12 RX ADMIN — DIPHENHYDRAMINE HYDROCHLORIDE 6.25 MG: 50 INJECTION INTRAMUSCULAR; INTRAVENOUS at 04:05

## 2020-05-12 RX ADMIN — OXYCODONE HYDROCHLORIDE AND ACETAMINOPHEN 1 TABLET: 5; 325 TABLET ORAL at 10:05

## 2020-05-12 RX ADMIN — OXYCODONE HYDROCHLORIDE AND ACETAMINOPHEN 1 TABLET: 5; 325 TABLET ORAL at 11:05

## 2020-05-12 RX ADMIN — POLYETHYLENE GLYCOL 3350 17 G: 17 POWDER, FOR SOLUTION ORAL at 12:05

## 2020-05-12 RX ADMIN — OXYCODONE HYDROCHLORIDE AND ACETAMINOPHEN 1 TABLET: 5; 325 TABLET ORAL at 04:05

## 2020-05-12 RX ADMIN — CALCIUM CARBONATE (ANTACID) CHEW TAB 500 MG 500 MG: 500 CHEW TAB at 06:05

## 2020-05-12 RX ADMIN — DIPHENHYDRAMINE HYDROCHLORIDE 6.25 MG: 50 INJECTION INTRAMUSCULAR; INTRAVENOUS at 11:05

## 2020-05-12 RX ADMIN — CEFTRIAXONE 1 G: 1 INJECTION, SOLUTION INTRAVENOUS at 10:05

## 2020-05-12 RX ADMIN — DIPHENHYDRAMINE HYDROCHLORIDE 6.25 MG: 50 INJECTION INTRAMUSCULAR; INTRAVENOUS at 10:05

## 2020-05-12 NOTE — ASSESSMENT & PLAN NOTE
Patient denies trouble voiding or dysuria however complains of CVA tenderness and pain that radiates to her mid epigastric area that sharp and stabbing at times; CT abdomen pelvis significant for finding suggestive of pyelonephritis with some Emeli-nephritic stranding patient also has an intrauterine device.  She had low-grade temp and I 9.2 at the time of presentation as well as intractable pain.  Continues to complain of pain 7/10  -start IV antibiotics  -supportive care  -IV fluids  -repeat UA and culture  -as urine sodium protein  -consult nephrology

## 2020-05-12 NOTE — PROGRESS NOTES
Renal Progress Note    Date of Admission:  5/10/2020  9:33 PM    Length of Stay: 1  Days    Subjective: intermittent abdominal discomfort    Objective:    Current Facility-Administered Medications   Medication    0.9%  NaCl infusion    cefTRIAXone (ROCEPHIN) 1 g/50 mL D5W IVPB    diphenhydrAMINE injection 6.25 mg    enoxaparin injection 40 mg    oxyCODONE-acetaminophen 5-325 mg per tablet 1 tablet       Vitals:    05/11/20 2046 05/12/20 0027 05/12/20 0358 05/12/20 0716   BP: 130/63 133/62 118/68 110/85   BP Location: Left arm Left arm Right arm Right arm   Patient Position: Lying Lying Lying Sitting   Pulse: 77 90 86 87   Resp: 19 20 18 20   Temp: 97.9 °F (36.6 °C) 98.5 °F (36.9 °C) 98.4 °F (36.9 °C) 98.1 °F (36.7 °C)   TempSrc: Oral Oral Oral Oral   SpO2: 96% 96% (!) 94% 97%   Weight:       Height:           I/O last 3 completed shifts:  In: 340 [P.O.:340]  Out: 1100 [Urine:1100]  I/O this shift:  In: 120 [P.O.:120]  Out: -       Physical Exam:      General: No apparent distress.   Neck: supple   Lungs: Unlabored breathing  Heart: RRR  Abdomen: n/a  Ext: n/a  Neurologic: Awake and alert, oriented x 3      Laboratories:    Recent Labs   Lab 05/12/20  0500   WBC 16.62*   RBC 3.32*   HGB 11.6*   HCT 35.0*      *   MCH 34.9*   MCHC 33.1       Recent Labs   Lab 05/12/20  0500   CALCIUM 8.8   PROT 6.6      K 4.7   CO2 19*      BUN 24*   CREATININE 3.4*   ALKPHOS 90   ALT 16   AST 17   BILITOT 0.2       No results for input(s): COLORU, CLARITYU, SPECGRAV, PHUR, PROTEINUA, GLUCOSEU, BLOODU, WBCU, RBCU, BACTERIA, MUCUS in the last 24 hours.    Invalid input(s):  BILIRUBINCON    Microbiology Results (last 7 days)     Procedure Component Value Units Date/Time    Urine culture **CANNOT BE ORDERED STAT** [[id number]] Collected:  05/11/20 0027    Order Status:  Completed Specimen:  Urine, Clean Catch Updated:  05/12/20 0835     Urine Culture, Routine No significant isolate to  date    Narrative:       Indicated criteria for high risk culture:->Other  Other (specify):->pyelonephritis            Diagnostic Tests: n/a        Assessment:       40 y/o female admitted with:     - Flank pain r/o Pyelonephritis? So far urine C&S not diagnostic and UA not indicative for UTI but Leukocytosis present.  - BRIANNA et?  last creatinine value on record was in 2018  - Proteinuria estimated at > 2 grams per day  - Hx. Gastric sleeve  - Hypoalbuminemia  - Mild anemia        Plan:     - IVFs 0.45% saline  - Empiric IV antibiotics   - f/u BMP  - Will need out-pte follow up upon discharge and possible elective Kidney biopsy

## 2020-05-12 NOTE — HOSPITAL COURSE
"Nando Fong 39 y.o. AA female with PMHx:GERD and gastric sleeve (about 6 years ago) who presented with complaint of 1 day history of Right CVA tenderness (7/10), with associated NV, one episode (NBNB). Reports radiating pain to upper epigastric area, sharp intermittent stabbing. She denies constipation or diarrhea, recent trauma. She denies trouble voiding and fever/chills, HA, CP, recent trauma, melana, hematochezia, hemoptysis. She denies fever/chills, melena or hematemesis. Denies dysuria or voiding difficulty.  Initial workup includes [id number]=661; creatinine=2.6 and AST,urinalysis unrevealing; Covid negative; CT abd/pelis significant for, "perinephric fluid and stranding on left,minimally on right; possible pyelonephritis" as well as small hernia.she was started  on IV Abx.  Nephrology was consulted for BRIANNA,was on IVF with no improvement,has urinary retention,in and out cath as needed.BHASKAR show no obstruction.PTH was 88.her ARF gradually improved,patient was discharged home with PO Abx and follow up with PCP,nephrology and GI as out patient.  "

## 2020-05-12 NOTE — PROGRESS NOTES
"[facility] Hospital Medicine  Progress Note    Patient Name: Nando Fong  MRN: [id number]  Patient Class: IP- Inpatient   Admission Date: 5/10/2020  Length of Stay: 1 days  Attending Physician: Edouard Barraza MD  Primary Care Provider: To Obtain Unable        Subjective:     Principal Problem:Acute pyelonephritis        HPI:  Nando Fong 39 y.o. AA female with PMHx:GERD and gastric sleeve (about 6 years ago) who presented with complaint of 1 day history of Right CVA tenderness (7/10), with associated NV, one episode (NBNB). Reports radiating pain to upper epigastric area, sharp intermittent stabbing. She denies constipation or diarrhea, recent trauma. She denies trouble voiding and fever/chills, HA, CP, recent trauma, melana, hematochezia, hemoptysis. She denies fever/chills, melena or hematemesis. Denies dysuria or voiding difficulty.    Initial workup includes [id number]=502; creatinine=2.6 and AST,urinalysis unrevealing; Covid negative; CT abd/pelis significant for, "perinephric fluid and stranding on left,minimally on right; possible pyelonephritis" as well as small hernia.            Overview/Hospital Course:  Nando Fong 39 y.o. AA female with PMHx:GERD and gastric sleeve (about 6 years ago) who presented with complaint of 1 day history of Right CVA tenderness (7/10), with associated NV, one episode (NBNB). Reports radiating pain to upper epigastric area, sharp intermittent stabbing. She denies constipation or diarrhea, recent trauma. She denies trouble voiding and fever/chills, HA, CP, recent trauma, melana, hematochezia, hemoptysis. She denies fever/chills, melena or hematemesis. Denies dysuria or voiding difficulty.  Initial workup includes [id number]=877; creatinine=2.6 and AST,urinalysis unrevealing; Covid negative; CT abd/pelis significant for, "perinephric fluid and stranding on left,minimally on right; possible pyelonephritis" as well as small hernia.she is " on IV Abx.  Nephrology is consulted for BRIANNA,on IVF with no improvement,has urinary retention,in and out cath as needed.BHASKAR show no obstruction.    Past Medical History:   Diagnosis Date    GERD (gastroesophageal reflux disease)        Past Surgical History:   Procedure Laterality Date    BREAST SURGERY      gastric sleeve         Review of patient's allergies indicates:   Allergen Reactions    Morphine Itching    Percocet [oxycodone-acetaminophen]        No current facility-administered medications on file prior to encounter.      Current Outpatient Medications on File Prior to Encounter   Medication Sig    levonorgestrel (MIRENA) 20 mcg/24 hr (5 years) IUD 1 each by Intrauterine route once.     Family History     None        Tobacco Use    Smoking status: Never Smoker    Smokeless tobacco: Never Used   Substance and Sexual Activity    Alcohol use: Yes     Comment: socially    Drug use: No    Sexual activity: Yes     Partners: Male     Birth control/protection: IUD     Review of Systems   Constitutional: Negative for appetite change, chills, diaphoresis and fever.   HENT: Negative for congestion, hearing loss, sore throat, tinnitus and trouble swallowing.    Eyes: Negative for photophobia, discharge, itching and visual disturbance.   Respiratory: Negative for apnea, cough, wheezing and stridor.    Cardiovascular: Negative for chest pain, palpitations and leg swelling.   Gastrointestinal: Positive for abdominal pain. Negative for abdominal distention, blood in stool, constipation, diarrhea and nausea.   Endocrine: Negative for polydipsia, polyphagia and polyuria.   Genitourinary: Negative for difficulty urinating, dysuria, flank pain and frequency.   Musculoskeletal: Negative for arthralgias, joint swelling and neck stiffness.   Skin: Negative for color change, rash and wound.   Neurological: Negative for dizziness, tremors, seizures, light-headedness, numbness and headaches.   Hematological: Negative for  adenopathy.   Psychiatric/Behavioral: Negative for hallucinations and self-injury.     Objective:     Vital Signs (Most Recent):  Temp: 98.1 °F (36.7 °C) (05/12/20 0716)  Pulse: 87 (05/12/20 0716)  Resp: 20 (05/12/20 0716)  BP: 110/85 (05/12/20 0716)  SpO2: 97 % (05/12/20 0716) Vital Signs (24h Range):  Temp:  [97.9 °F (36.6 °C)-98.5 °F (36.9 °C)] 98.1 °F (36.7 °C)  Pulse:  [67-90] 87  Resp:  [18-20] 20  SpO2:  [94 %-98 %] 97 %  BP: (110-133)/(62-85) 110/85     Weight: 93 kg (205 lb)  Body mass index is 37.49 kg/m².    Physical Exam   Constitutional: She appears well-developed and well-nourished. She is cooperative.   HENT:   Head: Normocephalic and atraumatic.   Eyes: Conjunctivae and lids are normal.   Neck: Full passive range of motion without pain. Neck supple. No JVD present. No edema present. No thyroid mass present.   Cardiovascular: Normal rate, S1 normal, S2 normal and intact distal pulses.   No murmur heard.  Abdominal: Soft. Bowel sounds are normal. She exhibits no distension and no abdominal bruit. There is no splenomegaly or hepatomegaly. There is tenderness. There is guarding. There is no CVA tenderness.   Lymphadenopathy:     She has no cervical adenopathy.     She has no axillary adenopathy.   Neurological: She is alert. She has normal reflexes. She displays no tremor. She displays no seizure activity.   Skin: Skin is warm, dry and intact.   Psychiatric: She has a normal mood and affect. Her speech is normal. Thought content normal. Cognition and memory are normal.           Results for RADHA GILLIAM (MRN [id number]) as of 5/11/2020 10:19   Ref. Range 2/8/2011 08:44 9/13/2018 09:23   WBC Latest Ref Range: 4.8 - 10.8 K/uL 5.56    RBC Latest Ref Range: 4.00 - 5.40 M/uL 4.61    Hemoglobin Latest Ref Range: 12.0 - 16.0 gm/dl 13.2    Hematocrit Latest Ref Range: 37.0 - 48.5 % 40.5    MCV Latest Ref Range: 82 - 95 fL 87.9    MCH Latest Ref Range: 27 - 31 pg 28.6    MCHC Latest Ref Range: 32 - 36 % 32.6     RDW Latest Ref Range: 11.5 - 14.5 % 13.5    Platelets Latest Ref Range: 150 - 350 K/uL 297    MPV Latest Ref Range: 9.2 - 12.9 fL 10.7    Gran% Latest Ref Range: 38 - 73 % 54.9    Gran # (ANC) Latest Ref Range: 1.8 - 7.7 K/uL 3.1    Lymph% Latest Ref Range: 21 - 44 % 37.9    Lymph # Latest Ref Range: 1 - 4.8 K/uL 2.1    Mono% Latest Ref Range: 0.0 - 7.4 % 6.3    Mono # Latest Ref Range: 0.0 - 0.8 K/uL 0.4    Eosinophil% Latest Ref Range: 0.0 - 4.2 % 0.5    Eos # Latest Ref Range: 0 - 0.45 K/uL 0.0    Basophil% Latest Ref Range: 0 - 1.9 % 0.4    Baso # Latest Ref Range: 0.0 - 0.2 K/uL 0.0    Interpretation Unknown  NO EPITHELIAL ABNORMALITY SEE BELOW         AWAITING BMP    Significant Labs: Significant Imaging:   Imaging Results           CT Abdomen Pelvis  Without Contrast (Final result)  Result time 05/10/20 23:14:45    Final result by Saji Porras MD (05/10/20 23:14:45)                 Impression:      1. Perinephric fluid and stranding on the left and minimally on the right.  This could represent pyelonephritis.  Recommend clinical correlation and follow-up.  See above comments.  2. Intrauterine device present in the lower uterine segment and cervix.  This is unchanged from the prior study.  Recommend GYN follow-up for better positioning.  3. 4.1 cm left ovarian cyst.  Recommend outpatient sonographic surveillance.  4. Chronic and postoperative changes.  See above comments.  5.  This report was flagged in Epic as abnormal.      Electronically signed by: Saji Porras  Date:    05/10/2020  Time:    23:14             Narrative:    EXAMINATION:  CT ABDOMEN PELVIS WITHOUT CONTRAST    CLINICAL HISTORY:  Abd pain, gastroenteritis or colitis suspected;    TECHNIQUE:  Low dose axial images, sagittal and coronal reformations were obtained from the lung bases to the pubic symphysis, Oral contrast was not administered.    COMPARISON:  07/09/2018    FINDINGS:  Heart: Normal in size. No pericardial effusion.    Lung  Bases: Well aerated, without consolidation or pleural fluid.    Liver: Normal in size and attenuation, with no focal hepatic lesions.    Gallbladder: No calcified gallstones.    Bile Ducts: No evidence of dilated ducts.    Pancreas: No mass or peripancreatic fat stranding.    Spleen: Unremarkable.    Adrenals: Unremarkable.    Kidneys/ Ureters: No stones are detected bilaterally.  There is mild left perinephric fluid and stranding present.  Minimal stranding on the right also.  No mass is detected.  No hydronephrosis bilaterally.  An infectious/inflammatory process such as pyelonephritis is a consideration.  Recommend clinical correlation and follow-up.    Bladder: No evidence of wall thickening.    Reproductive organs: The uterus is present.  Intrauterine device is present.  The intrauterine device is situated in the lower uterine segment and cervix.  Recommend GYN follow-up for better positioning.    4.1 cm left ovarian cyst.  Recommend outpatient sonographic surveillance.    GI Tract/Mesentery: No evidence of bowel obstruction or inflammation.    Postoperative changes of the stomach.    Peritoneal Space: No ascites. No free air.    Retroperitoneum: No significant adenopathy.    Abdominal wall: Small fat containing right anterior lateral abdominal wall hernia through the abdominal musculature on axial 32 possibly related to a prior surgical tract.  Minimal stranding in the left anterior abdomen subcutaneous fat.    Minimal fat containing umbilical hernia also.  These findings are unchanged.    Vasculature: No significant atherosclerosis or aneurysm.    Bones: No acute fracture.                                    Assessment/Plan:      * Acute pyelonephritis  Patient denies trouble voiding or dysuria however complains of CVA tenderness and pain that radiates to her mid epigastric area that sharp and stabbing at times; CT abdomen pelvis significant for finding suggestive of pyelonephritis with some Emeli-nephritic  stranding patient also has an intrauterine device.  She had low-grade temp and I 9.2 at the time of presentation as well as intractable pain.  Continues to complain of pain 7/10  -start IV antibiotics  -supportive care  -IV fluids  -repeat UA and culture  -as urine sodium protein  -consult nephrology         Urinary retention  As above.      Acute renal failure  No known history of renal failure patient presents with CT scan suggestive of pyelonephritis with low-grade temp, CV a pain and tenderness, and presenting creatinine = 2.6 improve slightly with IV fluids = 2.4 this morning creatinine back up to 3.2.    Estimated Creatinine Clearance: 24.8 mL/min (A) (based on SCr of 3.2 mg/dL (H)).  -start IV fluids at 100 cc an hour  -monitor chemistry closely  -urine studies  -renal US,show no obstruction.  -consult to Nephrology as above  -strict I&Os  Nephrology is consulted for BRIANNA,on IVF with no improvement,has urinary retention,in and out cath as needed.BHASKAR show no obstruction.    Results for RADHA GILLIAM (MRN [id number]) as of 5/11/2020 11:09   Ref. Range 5/11/2020 09:19   Sodium Latest Ref Range: 136 - 145 mmol/L 137   Potassium Latest Ref Range: 3.5 - 5.1 mmol/L 4.3   Chloride Latest Ref Range: 95 - 110 mmol/L 106   CO2 Latest Ref Range: 23 - 29 mmol/L 22 (L)   Anion Gap Latest Ref Range: 8 - 16 mmol/L 9   BUN, Bld Latest Ref Range: 6 - 20 mg/dL 13   Creatinine Latest Ref Range: 0.5 - 1.4 mg/dL 3.2 (H)   eGFR if non African American Latest Ref Range: >60 mL/min/1.73 m^2 17 (A)   eGFR if African American Latest Ref Range: >60 mL/min/1.73 m^2 20 (A)   Glucose Latest Ref Range: 70 - 110 mg/dL 117 (H)   Calcium Latest Ref Range: 8.7 - 10.5 mg/dL 8.6 (L)       Periumbilical abdominal pain  Supportive care---  Breakthrough  -Allergy to percocet      Hypernatremia  Urine sodium = 150 at the time of presentation but have self corrected now her sodium is 137  -monitor sodium closely along with other electrolytes  -locate  infuse IV normal saline at this time monitor closely  Improved.        VTE Risk Mitigation (From admission, onward)         Ordered     enoxaparin injection 40 mg  Daily      05/11/20 1025     IP VTE HIGH RISK PATIENT  Once      05/11/20 0802                      Edouard Barraza MD  Department of Hospital Medicine   [facility]

## 2020-05-12 NOTE — SUBJECTIVE & OBJECTIVE
Past Medical History:   Diagnosis Date    GERD (gastroesophageal reflux disease)        Past Surgical History:   Procedure Laterality Date    BREAST SURGERY      gastric sleeve         Review of patient's allergies indicates:   Allergen Reactions    Morphine Itching    Percocet [oxycodone-acetaminophen]        No current facility-administered medications on file prior to encounter.      Current Outpatient Medications on File Prior to Encounter   Medication Sig    levonorgestrel (MIRENA) 20 mcg/24 hr (5 years) IUD 1 each by Intrauterine route once.     Family History     None        Tobacco Use    Smoking status: Never Smoker    Smokeless tobacco: Never Used   Substance and Sexual Activity    Alcohol use: Yes     Comment: socially    Drug use: No    Sexual activity: Yes     Partners: Male     Birth control/protection: IUD     Review of Systems   Constitutional: Negative for appetite change, chills, diaphoresis and fever.   HENT: Negative for congestion, hearing loss, sore throat, tinnitus and trouble swallowing.    Eyes: Negative for photophobia, discharge, itching and visual disturbance.   Respiratory: Negative for apnea, cough, wheezing and stridor.    Cardiovascular: Negative for chest pain, palpitations and leg swelling.   Gastrointestinal: Positive for abdominal pain. Negative for abdominal distention, blood in stool, constipation, diarrhea and nausea.   Endocrine: Negative for polydipsia, polyphagia and polyuria.   Genitourinary: Negative for difficulty urinating, dysuria, flank pain and frequency.   Musculoskeletal: Negative for arthralgias, joint swelling and neck stiffness.   Skin: Negative for color change, rash and wound.   Neurological: Negative for dizziness, tremors, seizures, light-headedness, numbness and headaches.   Hematological: Negative for adenopathy.   Psychiatric/Behavioral: Negative for hallucinations and self-injury.     Objective:     Vital Signs (Most Recent):  Temp: 98.1 °F  (36.7 °C) (05/12/20 0716)  Pulse: 87 (05/12/20 0716)  Resp: 20 (05/12/20 0716)  BP: 110/85 (05/12/20 0716)  SpO2: 97 % (05/12/20 0716) Vital Signs (24h Range):  Temp:  [97.9 °F (36.6 °C)-98.5 °F (36.9 °C)] 98.1 °F (36.7 °C)  Pulse:  [67-90] 87  Resp:  [18-20] 20  SpO2:  [94 %-98 %] 97 %  BP: (110-133)/(62-85) 110/85     Weight: 93 kg (205 lb)  Body mass index is 37.49 kg/m².    Physical Exam   Constitutional: She appears well-developed and well-nourished. She is cooperative.   HENT:   Head: Normocephalic and atraumatic.   Eyes: Conjunctivae and lids are normal.   Neck: Full passive range of motion without pain. Neck supple. No JVD present. No edema present. No thyroid mass present.   Cardiovascular: Normal rate, S1 normal, S2 normal and intact distal pulses.   No murmur heard.  Abdominal: Soft. Bowel sounds are normal. She exhibits no distension and no abdominal bruit. There is no splenomegaly or hepatomegaly. There is tenderness. There is guarding. There is no CVA tenderness.   Lymphadenopathy:     She has no cervical adenopathy.     She has no axillary adenopathy.   Neurological: She is alert. She has normal reflexes. She displays no tremor. She displays no seizure activity.   Skin: Skin is warm, dry and intact.   Psychiatric: She has a normal mood and affect. Her speech is normal. Thought content normal. Cognition and memory are normal.           Results for RADHA GILLIAM (MRN [id number]) as of 5/11/2020 10:19   Ref. Range 2/8/2011 08:44 9/13/2018 09:23   WBC Latest Ref Range: 4.8 - 10.8 K/uL 5.56    RBC Latest Ref Range: 4.00 - 5.40 M/uL 4.61    Hemoglobin Latest Ref Range: 12.0 - 16.0 gm/dl 13.2    Hematocrit Latest Ref Range: 37.0 - 48.5 % 40.5    MCV Latest Ref Range: 82 - 95 fL 87.9    MCH Latest Ref Range: 27 - 31 pg 28.6    MCHC Latest Ref Range: 32 - 36 % 32.6    RDW Latest Ref Range: 11.5 - 14.5 % 13.5    Platelets Latest Ref Range: 150 - 350 K/uL 297    MPV Latest Ref Range: 9.2 - 12.9 fL 10.7    Gran%  Latest Ref Range: 38 - 73 % 54.9    Gran # (ANC) Latest Ref Range: 1.8 - 7.7 K/uL 3.1    Lymph% Latest Ref Range: 21 - 44 % 37.9    Lymph # Latest Ref Range: 1 - 4.8 K/uL 2.1    Mono% Latest Ref Range: 0.0 - 7.4 % 6.3    Mono # Latest Ref Range: 0.0 - 0.8 K/uL 0.4    Eosinophil% Latest Ref Range: 0.0 - 4.2 % 0.5    Eos # Latest Ref Range: 0 - 0.45 K/uL 0.0    Basophil% Latest Ref Range: 0 - 1.9 % 0.4    Baso # Latest Ref Range: 0.0 - 0.2 K/uL 0.0    Interpretation Unknown  NO EPITHELIAL ABNORMALITY SEE BELOW         AWAITING BMP    Significant Labs: Significant Imaging:   Imaging Results           CT Abdomen Pelvis  Without Contrast (Final result)  Result time 05/10/20 23:14:45    Final result by Saji Porras MD (05/10/20 23:14:45)                 Impression:      1. Perinephric fluid and stranding on the left and minimally on the right.  This could represent pyelonephritis.  Recommend clinical correlation and follow-up.  See above comments.  2. Intrauterine device present in the lower uterine segment and cervix.  This is unchanged from the prior study.  Recommend GYN follow-up for better positioning.  3. 4.1 cm left ovarian cyst.  Recommend outpatient sonographic surveillance.  4. Chronic and postoperative changes.  See above comments.  5.  This report was flagged in Epic as abnormal.      Electronically signed by: Saji Porras  Date:    05/10/2020  Time:    23:14             Narrative:    EXAMINATION:  CT ABDOMEN PELVIS WITHOUT CONTRAST    CLINICAL HISTORY:  Abd pain, gastroenteritis or colitis suspected;    TECHNIQUE:  Low dose axial images, sagittal and coronal reformations were obtained from the lung bases to the pubic symphysis, Oral contrast was not administered.    COMPARISON:  07/09/2018    FINDINGS:  Heart: Normal in size. No pericardial effusion.    Lung Bases: Well aerated, without consolidation or pleural fluid.    Liver: Normal in size and attenuation, with no focal hepatic  lesions.    Gallbladder: No calcified gallstones.    Bile Ducts: No evidence of dilated ducts.    Pancreas: No mass or peripancreatic fat stranding.    Spleen: Unremarkable.    Adrenals: Unremarkable.    Kidneys/ Ureters: No stones are detected bilaterally.  There is mild left perinephric fluid and stranding present.  Minimal stranding on the right also.  No mass is detected.  No hydronephrosis bilaterally.  An infectious/inflammatory process such as pyelonephritis is a consideration.  Recommend clinical correlation and follow-up.    Bladder: No evidence of wall thickening.    Reproductive organs: The uterus is present.  Intrauterine device is present.  The intrauterine device is situated in the lower uterine segment and cervix.  Recommend GYN follow-up for better positioning.    4.1 cm left ovarian cyst.  Recommend outpatient sonographic surveillance.    GI Tract/Mesentery: No evidence of bowel obstruction or inflammation.    Postoperative changes of the stomach.    Peritoneal Space: No ascites. No free air.    Retroperitoneum: No significant adenopathy.    Abdominal wall: Small fat containing right anterior lateral abdominal wall hernia through the abdominal musculature on axial 32 possibly related to a prior surgical tract.  Minimal stranding in the left anterior abdomen subcutaneous fat.    Minimal fat containing umbilical hernia also.  These findings are unchanged.    Vasculature: No significant atherosclerosis or aneurysm.    Bones: No acute fracture.

## 2020-05-12 NOTE — PLAN OF CARE
Problem: Adult Inpatient Plan of Care  Goal: Plan of Care Review  Outcome: Ongoing, Progressing  Goal: Patient-Specific Goal (Individualization)  Outcome: Ongoing, Progressing  Goal: Absence of Hospital-Acquired Illness or Injury  Outcome: Ongoing, Progressing  Goal: Optimal Comfort and Wellbeing  Outcome: Ongoing, Progressing  Goal: Readiness for Transition of Care  Outcome: Ongoing, Progressing  Goal: Rounds/Family Conference  Outcome: Ongoing, Progressing     Problem: Electrolyte Imbalance (Acute Kidney Injury/Impairment)  Goal: Serum Electrolyte Balance  Outcome: Ongoing, Progressing     Problem: Fluid Imbalance (Acute Kidney Injury/Impairment)  Goal: Optimal Fluid Balance  Outcome: Ongoing, Progressing     Problem: Hematologic Alteration (Acute Kidney Injury/Impairment)  Goal: Hemoglobin, Hematocrit and Platelets Within Normal Range  Outcome: Ongoing, Progressing     Problem: Oral Intake Inadequate (Acute Kidney Injury/Impairment)  Goal: Optimal Nutrition Intake  Outcome: Ongoing, Progressing     Problem: Renal Function Impairment (Acute Kidney Injury/Impairment)  Goal: Effective Renal Function  Outcome: Ongoing, Progressing

## 2020-05-12 NOTE — PLAN OF CARE
Pt awake and alert, given antacid for heartburn at 1830.  IV fluids infused as ordered.  Pt tolerating food well.  PRN medications given along with benadryl to decrease itching.  Voiding frequently.  Bladder scan at 3pm with 45 ml residual after spontaneous void.  Please re-scan at 9pm per order.  Will continue to monitor.

## 2020-05-12 NOTE — ASSESSMENT & PLAN NOTE
Urine sodium = 150 at the time of presentation but have self corrected now her sodium is 137  -monitor sodium closely along with other electrolytes  -locate infuse IV normal saline at this time monitor closely  Improved.

## 2020-05-13 LAB
ANION GAP SERPL CALC-SCNC: 6 MMOL/L (ref 8–16)
BACTERIA UR CULT: NORMAL
BUN SERPL-MCNC: 20 MG/DL (ref 6–20)
CALCIUM SERPL-MCNC: 8 MG/DL (ref 8.7–10.5)
CHLORIDE SERPL-SCNC: 109 MMOL/L (ref 95–110)
CO2 SERPL-SCNC: 22 MMOL/L (ref 23–29)
CREAT SERPL-MCNC: 2 MG/DL (ref 0.5–1.4)
EST. GFR  (AFRICAN AMERICAN): 35 ML/MIN/1.73 M^2
EST. GFR  (NON AFRICAN AMERICAN): 31 ML/MIN/1.73 M^2
GLUCOSE SERPL-MCNC: 89 MG/DL (ref 70–110)
POTASSIUM SERPL-SCNC: 4.4 MMOL/L (ref 3.5–5.1)
PTH-INTACT SERPL-MCNC: 83.8 PG/ML (ref 9–77)
PTH-INTACT SERPL-MCNC: 90.2 PG/ML (ref 9–77)
SODIUM SERPL-SCNC: 137 MMOL/L (ref 136–145)

## 2020-05-13 PROCEDURE — 63600175 PHARM REV CODE 636 W HCPCS: Performed by: HOSPITALIST

## 2020-05-13 PROCEDURE — 25000003 PHARM REV CODE 250: Performed by: NURSE PRACTITIONER

## 2020-05-13 PROCEDURE — 80048 BASIC METABOLIC PNL TOTAL CA: CPT

## 2020-05-13 PROCEDURE — 63600175 PHARM REV CODE 636 W HCPCS: Performed by: NURSE PRACTITIONER

## 2020-05-13 PROCEDURE — 83970 ASSAY OF PARATHORMONE: CPT

## 2020-05-13 PROCEDURE — 25000003 PHARM REV CODE 250: Performed by: HOSPITALIST

## 2020-05-13 PROCEDURE — 36415 COLL VENOUS BLD VENIPUNCTURE: CPT

## 2020-05-13 PROCEDURE — 11000001 HC ACUTE MED/SURG PRIVATE ROOM

## 2020-05-13 PROCEDURE — 51798 US URINE CAPACITY MEASURE: CPT

## 2020-05-13 RX ORDER — PANTOPRAZOLE SODIUM 40 MG/1
40 TABLET, DELAYED RELEASE ORAL DAILY
Status: DISCONTINUED | OUTPATIENT
Start: 2020-05-13 | End: 2020-05-14 | Stop reason: HOSPADM

## 2020-05-13 RX ORDER — ENOXAPARIN SODIUM 100 MG/ML
30 INJECTION SUBCUTANEOUS EVERY 24 HOURS
Status: DISCONTINUED | OUTPATIENT
Start: 2020-05-13 | End: 2020-05-14 | Stop reason: HOSPADM

## 2020-05-13 RX ADMIN — DIPHENHYDRAMINE HYDROCHLORIDE 6.25 MG: 50 INJECTION INTRAMUSCULAR; INTRAVENOUS at 08:05

## 2020-05-13 RX ADMIN — OXYCODONE HYDROCHLORIDE AND ACETAMINOPHEN 1 TABLET: 5; 325 TABLET ORAL at 08:05

## 2020-05-13 RX ADMIN — CEFTRIAXONE 1 G: 1 INJECTION, SOLUTION INTRAVENOUS at 10:05

## 2020-05-13 RX ADMIN — POLYETHYLENE GLYCOL 3350 17 G: 17 POWDER, FOR SOLUTION ORAL at 08:05

## 2020-05-13 RX ADMIN — PANTOPRAZOLE SODIUM 40 MG: 40 TABLET, DELAYED RELEASE ORAL at 10:05

## 2020-05-13 RX ADMIN — OXYCODONE HYDROCHLORIDE AND ACETAMINOPHEN 1 TABLET: 5; 325 TABLET ORAL at 04:05

## 2020-05-13 RX ADMIN — OXYCODONE HYDROCHLORIDE AND ACETAMINOPHEN 1 TABLET: 5; 325 TABLET ORAL at 10:05

## 2020-05-13 RX ADMIN — DIPHENHYDRAMINE HYDROCHLORIDE 6.25 MG: 50 INJECTION INTRAMUSCULAR; INTRAVENOUS at 04:05

## 2020-05-13 RX ADMIN — DIPHENHYDRAMINE HYDROCHLORIDE 6.25 MG: 50 INJECTION INTRAMUSCULAR; INTRAVENOUS at 10:05

## 2020-05-13 RX ADMIN — ENOXAPARIN SODIUM 30 MG: 100 INJECTION SUBCUTANEOUS at 04:05

## 2020-05-13 NOTE — NURSING
Pt states Tums don't work, so Protonix ordered 40mg daily starting now.  Bladder scan after spontaneous void had 79ml residual.  PRN pain medication and benadryl given per request and order.  Fluids DC'd per order, antibiotic administered.  Will continue to monitor.

## 2020-05-13 NOTE — SUBJECTIVE & OBJECTIVE
Past Medical History:   Diagnosis Date    GERD (gastroesophageal reflux disease)        Past Surgical History:   Procedure Laterality Date    BREAST SURGERY      gastric sleeve         Review of patient's allergies indicates:   Allergen Reactions    Morphine Itching    Percocet [oxycodone-acetaminophen]        No current facility-administered medications on file prior to encounter.      Current Outpatient Medications on File Prior to Encounter   Medication Sig    levonorgestrel (MIRENA) 20 mcg/24 hr (5 years) IUD 1 each by Intrauterine route once.     Family History     None        Tobacco Use    Smoking status: Never Smoker    Smokeless tobacco: Never Used   Substance and Sexual Activity    Alcohol use: Yes     Comment: socially    Drug use: No    Sexual activity: Yes     Partners: Male     Birth control/protection: IUD     Review of Systems   Constitutional: Negative for appetite change, chills, diaphoresis and fever.   HENT: Negative for congestion, hearing loss, sore throat, tinnitus and trouble swallowing.    Eyes: Negative for photophobia, discharge, itching and visual disturbance.   Respiratory: Negative for apnea, cough, wheezing and stridor.    Cardiovascular: Negative for chest pain, palpitations and leg swelling.   Gastrointestinal: Positive for abdominal pain. Negative for abdominal distention, blood in stool, constipation, diarrhea and nausea.   Endocrine: Negative for polydipsia, polyphagia and polyuria.   Genitourinary: Negative for difficulty urinating, dysuria, flank pain and frequency.   Musculoskeletal: Negative for arthralgias, joint swelling and neck stiffness.   Skin: Negative for color change, rash and wound.   Neurological: Negative for dizziness, tremors, seizures, light-headedness, numbness and headaches.   Hematological: Negative for adenopathy.   Psychiatric/Behavioral: Negative for hallucinations and self-injury.     Objective:     Vital Signs (Most Recent):  Temp: 98.3 °F  (36.8 °C) (05/13/20 0835)  Pulse: 81 (05/13/20 0835)  Resp: 19 (05/13/20 0835)  BP: 125/84 (05/13/20 0835)  SpO2: 99 % (05/13/20 0835) Vital Signs (24h Range):  Temp:  [98 °F (36.7 °C)-98.5 °F (36.9 °C)] 98.3 °F (36.8 °C)  Pulse:  [65-88] 81  Resp:  [16-20] 19  SpO2:  [98 %-99 %] 99 %  BP: (125-141)/(65-84) 125/84     Weight: 93 kg (205 lb)  Body mass index is 37.49 kg/m².    Physical Exam   Constitutional: She appears well-developed and well-nourished. She is cooperative.   HENT:   Head: Normocephalic and atraumatic.   Eyes: Conjunctivae and lids are normal.   Neck: Full passive range of motion without pain. Neck supple. No JVD present. No edema present. No thyroid mass present.   Cardiovascular: Normal rate, S1 normal, S2 normal and intact distal pulses.   No murmur heard.  Abdominal: Soft. Bowel sounds are normal. She exhibits no distension and no abdominal bruit. There is no splenomegaly or hepatomegaly. There is tenderness. There is guarding. There is no CVA tenderness.   Lymphadenopathy:     She has no cervical adenopathy.     She has no axillary adenopathy.   Neurological: She is alert. She has normal reflexes. She displays no tremor. She displays no seizure activity.   Skin: Skin is warm, dry and intact.   Psychiatric: She has a normal mood and affect. Her speech is normal. Thought content normal. Cognition and memory are normal.           Results for RADHA GILLIAM (MRN [id number]) as of 5/11/2020 10:19   Ref. Range 2/8/2011 08:44 9/13/2018 09:23   WBC Latest Ref Range: 4.8 - 10.8 K/uL 5.56    RBC Latest Ref Range: 4.00 - 5.40 M/uL 4.61    Hemoglobin Latest Ref Range: 12.0 - 16.0 gm/dl 13.2    Hematocrit Latest Ref Range: 37.0 - 48.5 % 40.5    MCV Latest Ref Range: 82 - 95 fL 87.9    MCH Latest Ref Range: 27 - 31 pg 28.6    MCHC Latest Ref Range: 32 - 36 % 32.6    RDW Latest Ref Range: 11.5 - 14.5 % 13.5    Platelets Latest Ref Range: 150 - 350 K/uL 297    MPV Latest Ref Range: 9.2 - 12.9 fL 10.7    Gran%  Latest Ref Range: 38 - 73 % 54.9    Gran # (ANC) Latest Ref Range: 1.8 - 7.7 K/uL 3.1    Lymph% Latest Ref Range: 21 - 44 % 37.9    Lymph # Latest Ref Range: 1 - 4.8 K/uL 2.1    Mono% Latest Ref Range: 0.0 - 7.4 % 6.3    Mono # Latest Ref Range: 0.0 - 0.8 K/uL 0.4    Eosinophil% Latest Ref Range: 0.0 - 4.2 % 0.5    Eos # Latest Ref Range: 0 - 0.45 K/uL 0.0    Basophil% Latest Ref Range: 0 - 1.9 % 0.4    Baso # Latest Ref Range: 0.0 - 0.2 K/uL 0.0    Interpretation Unknown  NO EPITHELIAL ABNORMALITY SEE BELOW         AWAITING BMP    Significant Labs: Significant Imaging:   Imaging Results           CT Abdomen Pelvis  Without Contrast (Final result)  Result time 05/10/20 23:14:45    Final result by Saji Porras MD (05/10/20 23:14:45)                 Impression:      1. Perinephric fluid and stranding on the left and minimally on the right.  This could represent pyelonephritis.  Recommend clinical correlation and follow-up.  See above comments.  2. Intrauterine device present in the lower uterine segment and cervix.  This is unchanged from the prior study.  Recommend GYN follow-up for better positioning.  3. 4.1 cm left ovarian cyst.  Recommend outpatient sonographic surveillance.  4. Chronic and postoperative changes.  See above comments.  5.  This report was flagged in Epic as abnormal.      Electronically signed by: Saji Porras  Date:    05/10/2020  Time:    23:14             Narrative:    EXAMINATION:  CT ABDOMEN PELVIS WITHOUT CONTRAST    CLINICAL HISTORY:  Abd pain, gastroenteritis or colitis suspected;    TECHNIQUE:  Low dose axial images, sagittal and coronal reformations were obtained from the lung bases to the pubic symphysis, Oral contrast was not administered.    COMPARISON:  07/09/2018    FINDINGS:  Heart: Normal in size. No pericardial effusion.    Lung Bases: Well aerated, without consolidation or pleural fluid.    Liver: Normal in size and attenuation, with no focal hepatic  lesions.    Gallbladder: No calcified gallstones.    Bile Ducts: No evidence of dilated ducts.    Pancreas: No mass or peripancreatic fat stranding.    Spleen: Unremarkable.    Adrenals: Unremarkable.    Kidneys/ Ureters: No stones are detected bilaterally.  There is mild left perinephric fluid and stranding present.  Minimal stranding on the right also.  No mass is detected.  No hydronephrosis bilaterally.  An infectious/inflammatory process such as pyelonephritis is a consideration.  Recommend clinical correlation and follow-up.    Bladder: No evidence of wall thickening.    Reproductive organs: The uterus is present.  Intrauterine device is present.  The intrauterine device is situated in the lower uterine segment and cervix.  Recommend GYN follow-up for better positioning.    4.1 cm left ovarian cyst.  Recommend outpatient sonographic surveillance.    GI Tract/Mesentery: No evidence of bowel obstruction or inflammation.    Postoperative changes of the stomach.    Peritoneal Space: No ascites. No free air.    Retroperitoneum: No significant adenopathy.    Abdominal wall: Small fat containing right anterior lateral abdominal wall hernia through the abdominal musculature on axial 32 possibly related to a prior surgical tract.  Minimal stranding in the left anterior abdomen subcutaneous fat.    Minimal fat containing umbilical hernia also.  These findings are unchanged.    Vasculature: No significant atherosclerosis or aneurysm.    Bones: No acute fracture.

## 2020-05-13 NOTE — PROGRESS NOTES
Bedside report received from [place], Patient care assumed. NAD noted. Respirations even and unlabored. Safety maintained. Call light within reach.

## 2020-05-13 NOTE — ASSESSMENT & PLAN NOTE
No known history of renal failure patient presents with CT scan suggestive of pyelonephritis with low-grade temp, CV a pain and tenderness, and presenting creatinine = 2.6 improve slightly with IV fluids = 2.4 this morning creatinine back up to 3.2.    Estimated Creatinine Clearance: 23.4 mL/min (A) (based on SCr of 3.4 mg/dL (H)).  -start IV fluids at 100 cc an hour  -monitor chemistry closely  -urine studies  -renal US,show no obstruction.  -consult to Nephrology as above  -strict I&Os  Nephrology is consulted for BRIANNA,on IVF with no improvement,has urinary retention,in and out cath as needed.BHASKAR show no obstruction..PTH is 88.    Results for RADHA GILLIAM (MRN [id number]) as of 5/11/2020 11:09   Ref. Range 5/11/2020 09:19   Sodium Latest Ref Range: 136 - 145 mmol/L 137   Potassium Latest Ref Range: 3.5 - 5.1 mmol/L 4.3   Chloride Latest Ref Range: 95 - 110 mmol/L 106   CO2 Latest Ref Range: 23 - 29 mmol/L 22 (L)   Anion Gap Latest Ref Range: 8 - 16 mmol/L 9   BUN, Bld Latest Ref Range: 6 - 20 mg/dL 13   Creatinine Latest Ref Range: 0.5 - 1.4 mg/dL 3.2 (H)   eGFR if non African American Latest Ref Range: >60 mL/min/1.73 m^2 17 (A)   eGFR if African American Latest Ref Range: >60 mL/min/1.73 m^2 20 (A)   Glucose Latest Ref Range: 70 - 110 mg/dL 117 (H)   Calcium Latest Ref Range: 8.7 - 10.5 mg/dL 8.6 (L)

## 2020-05-13 NOTE — PLAN OF CARE
Plan of care reviewed with Pt.  Pt asked about discharge and told that nephrology has to review her chart before they make a decision.  PRN pain medications administered as ordered, along with benadryl to control itching.  Pt compliant with plan of care, will continue to monitor.

## 2020-05-13 NOTE — PROGRESS NOTES
"[facility] Hospital Medicine  Progress Note    Patient Name: Nando Fong  MRN: [id number]  Patient Class: IP- Inpatient   Admission Date: 5/10/2020  Length of Stay: 2 days  Attending Physician: Edouard Barraza MD  Primary Care Provider: To Obtain Unable        Subjective:     Principal Problem:Acute pyelonephritis        HPI:  Nando Fong 39 y.o. AA female with PMHx:GERD and gastric sleeve (about 6 years ago) who presented with complaint of 1 day history of Right CVA tenderness (7/10), with associated NV, one episode (NBNB). Reports radiating pain to upper epigastric area, sharp intermittent stabbing. She denies constipation or diarrhea, recent trauma. She denies trouble voiding and fever/chills, HA, CP, recent trauma, melana, hematochezia, hemoptysis. She denies fever/chills, melena or hematemesis. Denies dysuria or voiding difficulty.    Initial workup includes [id number]; creatinine=2.6 and AST,urinalysis unrevealing; Covid negative; CT abd/pelis significant for, "perinephric fluid and stranding on left,minimally on right; possible pyelonephritis" as well as small hernia.            Overview/Hospital Course:  Nando Fong 39 y.o. AA female with PMHx:GERD and gastric sleeve (about 6 years ago) who presented with complaint of 1 day history of Right CVA tenderness (7/10), with associated NV, one episode (NBNB). Reports radiating pain to upper epigastric area, sharp intermittent stabbing. She denies constipation or diarrhea, recent trauma. She denies trouble voiding and fever/chills, HA, CP, recent trauma, melana, hematochezia, hemoptysis. She denies fever/chills, melena or hematemesis. Denies dysuria or voiding difficulty.  Initial workup includes [id number]; creatinine=2.6 and AST,urinalysis unrevealing; Covid negative; CT abd/pelis significant for, "perinephric fluid and stranding on left,minimally on right; possible pyelonephritis" as well as small hernia.she is " on IV Abx.  Nephrology is consulted for BRIANNA,on IVF with no improvement,has urinary retention,in and out cath as needed.BHASKAR show no obstruction.PTH is 88.    Past Medical History:   Diagnosis Date    GERD (gastroesophageal reflux disease)        Past Surgical History:   Procedure Laterality Date    BREAST SURGERY      gastric sleeve         Review of patient's allergies indicates:   Allergen Reactions    Morphine Itching    Percocet [oxycodone-acetaminophen]        No current facility-administered medications on file prior to encounter.      Current Outpatient Medications on File Prior to Encounter   Medication Sig    levonorgestrel (MIRENA) 20 mcg/24 hr (5 years) IUD 1 each by Intrauterine route once.     Family History     None        Tobacco Use    Smoking status: Never Smoker    Smokeless tobacco: Never Used   Substance and Sexual Activity    Alcohol use: Yes     Comment: socially    Drug use: No    Sexual activity: Yes     Partners: Male     Birth control/protection: IUD     Review of Systems   Constitutional: Negative for appetite change, chills, diaphoresis and fever.   HENT: Negative for congestion, hearing loss, sore throat, tinnitus and trouble swallowing.    Eyes: Negative for photophobia, discharge, itching and visual disturbance.   Respiratory: Negative for apnea, cough, wheezing and stridor.    Cardiovascular: Negative for chest pain, palpitations and leg swelling.   Gastrointestinal: Positive for abdominal pain. Negative for abdominal distention, blood in stool, constipation, diarrhea and nausea.   Endocrine: Negative for polydipsia, polyphagia and polyuria.   Genitourinary: Negative for difficulty urinating, dysuria, flank pain and frequency.   Musculoskeletal: Negative for arthralgias, joint swelling and neck stiffness.   Skin: Negative for color change, rash and wound.   Neurological: Negative for dizziness, tremors, seizures, light-headedness, numbness and headaches.   Hematological:  Negative for adenopathy.   Psychiatric/Behavioral: Negative for hallucinations and self-injury.     Objective:     Vital Signs (Most Recent):  Temp: 98.3 °F (36.8 °C) (05/13/20 0835)  Pulse: 81 (05/13/20 0835)  Resp: 19 (05/13/20 0835)  BP: 125/84 (05/13/20 0835)  SpO2: 99 % (05/13/20 0835) Vital Signs (24h Range):  Temp:  [98 °F (36.7 °C)-98.5 °F (36.9 °C)] 98.3 °F (36.8 °C)  Pulse:  [65-88] 81  Resp:  [16-20] 19  SpO2:  [98 %-99 %] 99 %  BP: (125-141)/(65-84) 125/84     Weight: 93 kg (205 lb)  Body mass index is 37.49 kg/m².    Physical Exam   Constitutional: She appears well-developed and well-nourished. She is cooperative.   HENT:   Head: Normocephalic and atraumatic.   Eyes: Conjunctivae and lids are normal.   Neck: Full passive range of motion without pain. Neck supple. No JVD present. No edema present. No thyroid mass present.   Cardiovascular: Normal rate, S1 normal, S2 normal and intact distal pulses.   No murmur heard.  Abdominal: Soft. Bowel sounds are normal. She exhibits no distension and no abdominal bruit. There is no splenomegaly or hepatomegaly. There is tenderness. There is guarding. There is no CVA tenderness.   Lymphadenopathy:     She has no cervical adenopathy.     She has no axillary adenopathy.   Neurological: She is alert. She has normal reflexes. She displays no tremor. She displays no seizure activity.   Skin: Skin is warm, dry and intact.   Psychiatric: She has a normal mood and affect. Her speech is normal. Thought content normal. Cognition and memory are normal.           Results for RADHA GILLIAM (MRN [id number]) as of 5/11/2020 10:19   Ref. Range 2/8/2011 08:44 9/13/2018 09:23   WBC Latest Ref Range: 4.8 - 10.8 K/uL 5.56    RBC Latest Ref Range: 4.00 - 5.40 M/uL 4.61    Hemoglobin Latest Ref Range: 12.0 - 16.0 gm/dl 13.2    Hematocrit Latest Ref Range: 37.0 - 48.5 % 40.5    MCV Latest Ref Range: 82 - 95 fL 87.9    MCH Latest Ref Range: 27 - 31 pg 28.6    MCHC Latest Ref Range: 32 -  36 % 32.6    RDW Latest Ref Range: 11.5 - 14.5 % 13.5    Platelets Latest Ref Range: 150 - 350 K/uL 297    MPV Latest Ref Range: 9.2 - 12.9 fL 10.7    Gran% Latest Ref Range: 38 - 73 % 54.9    Gran # (ANC) Latest Ref Range: 1.8 - 7.7 K/uL 3.1    Lymph% Latest Ref Range: 21 - 44 % 37.9    Lymph # Latest Ref Range: 1 - 4.8 K/uL 2.1    Mono% Latest Ref Range: 0.0 - 7.4 % 6.3    Mono # Latest Ref Range: 0.0 - 0.8 K/uL 0.4    Eosinophil% Latest Ref Range: 0.0 - 4.2 % 0.5    Eos # Latest Ref Range: 0 - 0.45 K/uL 0.0    Basophil% Latest Ref Range: 0 - 1.9 % 0.4    Baso # Latest Ref Range: 0.0 - 0.2 K/uL 0.0    Interpretation Unknown  NO EPITHELIAL ABNORMALITY SEE BELOW         AWAITING BMP    Significant Labs: Significant Imaging:   Imaging Results           CT Abdomen Pelvis  Without Contrast (Final result)  Result time 05/10/20 23:14:45    Final result by Saji Porras MD (05/10/20 23:14:45)                 Impression:      1. Perinephric fluid and stranding on the left and minimally on the right.  This could represent pyelonephritis.  Recommend clinical correlation and follow-up.  See above comments.  2. Intrauterine device present in the lower uterine segment and cervix.  This is unchanged from the prior study.  Recommend GYN follow-up for better positioning.  3. 4.1 cm left ovarian cyst.  Recommend outpatient sonographic surveillance.  4. Chronic and postoperative changes.  See above comments.  5.  This report was flagged in Epic as abnormal.      Electronically signed by: Saji Porras  Date:    05/10/2020  Time:    23:14             Narrative:    EXAMINATION:  CT ABDOMEN PELVIS WITHOUT CONTRAST    CLINICAL HISTORY:  Abd pain, gastroenteritis or colitis suspected;    TECHNIQUE:  Low dose axial images, sagittal and coronal reformations were obtained from the lung bases to the pubic symphysis, Oral contrast was not administered.    COMPARISON:  07/09/2018    FINDINGS:  Heart: Normal in size. No pericardial  effusion.    Lung Bases: Well aerated, without consolidation or pleural fluid.    Liver: Normal in size and attenuation, with no focal hepatic lesions.    Gallbladder: No calcified gallstones.    Bile Ducts: No evidence of dilated ducts.    Pancreas: No mass or peripancreatic fat stranding.    Spleen: Unremarkable.    Adrenals: Unremarkable.    Kidneys/ Ureters: No stones are detected bilaterally.  There is mild left perinephric fluid and stranding present.  Minimal stranding on the right also.  No mass is detected.  No hydronephrosis bilaterally.  An infectious/inflammatory process such as pyelonephritis is a consideration.  Recommend clinical correlation and follow-up.    Bladder: No evidence of wall thickening.    Reproductive organs: The uterus is present.  Intrauterine device is present.  The intrauterine device is situated in the lower uterine segment and cervix.  Recommend GYN follow-up for better positioning.    4.1 cm left ovarian cyst.  Recommend outpatient sonographic surveillance.    GI Tract/Mesentery: No evidence of bowel obstruction or inflammation.    Postoperative changes of the stomach.    Peritoneal Space: No ascites. No free air.    Retroperitoneum: No significant adenopathy.    Abdominal wall: Small fat containing right anterior lateral abdominal wall hernia through the abdominal musculature on axial 32 possibly related to a prior surgical tract.  Minimal stranding in the left anterior abdomen subcutaneous fat.    Minimal fat containing umbilical hernia also.  These findings are unchanged.    Vasculature: No significant atherosclerosis or aneurysm.    Bones: No acute fracture.                                    Assessment/Plan:      * Acute pyelonephritis  Patient denies trouble voiding or dysuria however complains of CVA tenderness and pain that radiates to her mid epigastric area that sharp and stabbing at times; CT abdomen pelvis significant for finding suggestive of pyelonephritis with some  Emeli-nephritic stranding patient also has an intrauterine device.  She had low-grade temp and I 9.2 at the time of presentation as well as intractable pain.  Continues to complain of pain 7/10  -start IV antibiotics  -supportive care  -IV fluids  -repeat UA and culture  -as urine sodium protein  -consult nephrology         Urinary retention  As above.      Acute renal failure  No known history of renal failure patient presents with CT scan suggestive of pyelonephritis with low-grade temp, CV a pain and tenderness, and presenting creatinine = 2.6 improve slightly with IV fluids = 2.4 this morning creatinine back up to 3.2.    Estimated Creatinine Clearance: 23.4 mL/min (A) (based on SCr of 3.4 mg/dL (H)).  -start IV fluids at 100 cc an hour  -monitor chemistry closely  -urine studies  -renal US,show no obstruction.  -consult to Nephrology as above  -strict I&Os  Nephrology is consulted for BRIANNA,on IVF with no improvement,has urinary retention,in and out cath as needed.BHASKAR show no obstruction..PTH is 88.    Results for RADHA GILLIAM (MRN [id number]) as of 5/11/2020 11:09   Ref. Range 5/11/2020 09:19   Sodium Latest Ref Range: 136 - 145 mmol/L 137   Potassium Latest Ref Range: 3.5 - 5.1 mmol/L 4.3   Chloride Latest Ref Range: 95 - 110 mmol/L 106   CO2 Latest Ref Range: 23 - 29 mmol/L 22 (L)   Anion Gap Latest Ref Range: 8 - 16 mmol/L 9   BUN, Bld Latest Ref Range: 6 - 20 mg/dL 13   Creatinine Latest Ref Range: 0.5 - 1.4 mg/dL 3.2 (H)   eGFR if non African American Latest Ref Range: >60 mL/min/1.73 m^2 17 (A)   eGFR if African American Latest Ref Range: >60 mL/min/1.73 m^2 20 (A)   Glucose Latest Ref Range: 70 - 110 mg/dL 117 (H)   Calcium Latest Ref Range: 8.7 - 10.5 mg/dL 8.6 (L)       Periumbilical abdominal pain  Supportive care---  Breakthrough  -Allergy to percocet      Hypernatremia  Urine sodium = 150 at the time of presentation but have self corrected now her sodium is 137  -monitor sodium closely along with  other electrolytes  -locate infuse IV normal saline at this time monitor closely  Improved.        VTE Risk Mitigation (From admission, onward)         Ordered     enoxaparin injection 30 mg  Daily      05/13/20 0645     IP VTE HIGH RISK PATIENT  Once      05/11/20 0802                      Edouard Barraza MD  Department of Hospital Medicine   [facility]

## 2020-05-13 NOTE — PROGRESS NOTES
Pharmacist Renal Dose Adjustment Note    Nando Fong is a 39 y.o. female being treated with the medication Enoxaparin    Patient Data:    Vital Signs (Most Recent):  Temp: 98.5 °F (36.9 °C) (05/13/20 0420)  Pulse: 87 (05/13/20 0420)  Resp: 18 (05/13/20 0420)  BP: 132/82 (05/13/20 0420)  SpO2: 98 % (05/13/20 0420) Vital Signs (72h Range):  Temp:  [97.9 °F (36.6 °C)-99.2 °F (37.3 °C)]   Pulse:  [65-99]   Resp:  [16-20]   BP: (110-163)/(59-98)   SpO2:  [94 %-100 %]      Recent Labs   Lab 05/11/20  0919 05/12/20  0500   CREATININE 3.2* 3.4*     Serum creatinine: 3.4 mg/dL (H) 05/12/20 0500  Estimated creatinine clearance: 23.4 mL/min (A)    Enoxaparin injection 40 mg daily will be changed to medication enoxaparin 30 mg daily    Pharmacist's Name: Kristi Coreas  Pharmacist's Extension: [phone]

## 2020-05-13 NOTE — NURSING
Bladder scan showing 46ml, patient states she is voiding without difficult, no bleeding or burning with urination

## 2020-05-13 NOTE — PROGRESS NOTES
Renal Progress Note    Date of Admission:  5/10/2020  9:33 PM    Length of Stay: 2  Days    Subjective: intermittent abdominal discomfort    Objective:    Current Facility-Administered Medications   Medication    0.45% NaCl infusion    calcium carbonate 200 mg calcium (500 mg) chewable tablet 500 mg    cefTRIAXone (ROCEPHIN) 1 g/50 mL D5W IVPB    diphenhydrAMINE injection 6.25 mg    enoxaparin injection 30 mg    oxyCODONE-acetaminophen 5-325 mg per tablet 1 tablet    polyethylene glycol packet 17 g       Vitals:    05/12/20 1548 05/12/20 2054 05/12/20 2351 05/13/20 0420   BP: (!) 141/66 (!) 141/81 136/77 132/82   BP Location: Right arm Right arm Right arm Right arm   Patient Position: Sitting Lying Lying Lying   Pulse: 65 68 88 87   Resp: 20 20 18 18   Temp: 98.1 °F (36.7 °C) 98.1 °F (36.7 °C) 98.4 °F (36.9 °C) 98.5 °F (36.9 °C)   TempSrc: Oral Oral Oral Oral   SpO2: 98% 98% 98% 98%   Weight:       Height:           I/O last 3 completed shifts:  In: 1861.3 [P.O.:1080; I.V.:781.3]  Out: 1080 [Urine:1080]  No intake/output data recorded.      Physical Exam:      General: No apparent distress.   Neck: supple   Lungs: Unlabored breathing  Heart: RRR  Abdomen: n/a  Ext: n/a  Neurologic: Asleep      Laboratories:    No results for input(s): WBC, RBC, HGB, HCT, PLT, MCV, MCH, MCHC in the last 24 hours.    No results for input(s): GLUCOSE, CALCIUM, PROT, NA, K, CO2, CL, BUN, CREATININE, ALKPHOS, ALT, AST, BILITOT in the last 24 hours.    Invalid input(s):  ALBUMIN    No results for input(s): COLORU, CLARITYU, SPECGRAV, PHUR, PROTEINUA, GLUCOSEU, BLOODU, WBCU, RBCU, BACTERIA, MUCUS in the last 24 hours.    Invalid input(s):  BILIRUBINCON    Microbiology Results (last 7 days)     Procedure Component Value Units Date/Time    Urine culture **CANNOT BE ORDERED STAT** [[id number]] Collected:  05/11/20 0027    Order Status:  Completed Specimen:  Urine, Clean Catch Updated:  05/13/20 0746     Urine  Culture, Routine No significant growth    Narrative:       Indicated criteria for high risk culture:->Other  Other (specify):->pyelonephritis            Diagnostic Tests:     US RETROPERITONEAL COMPLETE    CLINICAL HISTORY:  acute kidney injury;    TECHNIQUE:  Ultrasound of the kidneys and urinary bladder was performed including color flow and Doppler evaluation of the kidneys.    COMPARISON:  05/10/2020 CT    FINDINGS:  Right kidney: The right kidney measures 9.8 cm. No cortical thinning. No loss of corticomedullary distinction. Resistive index measures 0.73.  No mass. No renal stone. No hydronephrosis.There is trace fluid surrounding the right kidney, similar to appearance on yesterday's CT.    Left kidney: The left kidney measures 10.3 cm. No cortical thinning. No loss of corticomedullary distinction. Resistive index measures 0.74.  No mass. No renal stone. No hydronephrosis.    The bladder is partially distended at the time of scanning and has an unremarkable appearance.   Impression:       Findings most consistent with medical-renal disease.  No hydronephrosis.      Electronically signed by: Jatin Kenyon MD             Assessment:       38 y/o female admitted with:     - Flank pain r/o Pyelonephritis? urine C&S negative and UA not indicative for UTI but Leukocytosis present.  - BRIANNA et?  last creatinine value on record was in 2018 v.s. CKD which Renal US may suggest  - Proteinuria estimated at > 2 grams per day  - Hx. Gastric sleeve  - Hypoalbuminemia  - High iPTH   - Mild anemia        Plan:    - stop IVFs   - Empiric IV antibiotics   - f/u BMP  - Repeat CBC in a.m.  - Check vit D level  - Limited serologies in a.m.  - Will schedule out-pte follow up in my office upon discharge, pte. May require elective diagnostic CT guided Kidney biopsy

## 2020-05-14 VITALS
DIASTOLIC BLOOD PRESSURE: 85 MMHG | TEMPERATURE: 99 F | BODY MASS INDEX: 37.73 KG/M2 | HEART RATE: 65 BPM | WEIGHT: 205 LBS | HEIGHT: 62 IN | RESPIRATION RATE: 17 BRPM | OXYGEN SATURATION: 98 % | SYSTOLIC BLOOD PRESSURE: 145 MMHG

## 2020-05-14 LAB
25(OH)D3+25(OH)D2 SERPL-MCNC: 5 NG/ML (ref 30–96)
ANION GAP SERPL CALC-SCNC: 7 MMOL/L (ref 8–16)
BASOPHILS # BLD AUTO: 0.02 K/UL (ref 0–0.2)
BASOPHILS NFR BLD: 0.3 % (ref 0–1.9)
BUN SERPL-MCNC: 17 MG/DL (ref 6–20)
CALCIUM SERPL-MCNC: 8.3 MG/DL (ref 8.7–10.5)
CHLORIDE SERPL-SCNC: 109 MMOL/L (ref 95–110)
CO2 SERPL-SCNC: 24 MMOL/L (ref 23–29)
CREAT SERPL-MCNC: 1.5 MG/DL (ref 0.5–1.4)
DIFFERENTIAL METHOD: ABNORMAL
EOSINOPHIL # BLD AUTO: 0 K/UL (ref 0–0.5)
EOSINOPHIL NFR BLD: 0.4 % (ref 0–8)
ERYTHROCYTE [DISTWIDTH] IN BLOOD BY AUTOMATED COUNT: 12.2 % (ref 11.5–14.5)
EST. GFR  (AFRICAN AMERICAN): 50 ML/MIN/1.73 M^2
EST. GFR  (NON AFRICAN AMERICAN): 43 ML/MIN/1.73 M^2
GLUCOSE SERPL-MCNC: 74 MG/DL (ref 70–110)
HBV SURFACE AB SER-ACNC: NEGATIVE M[IU]/ML
HCT VFR BLD AUTO: 35 % (ref 37–48.5)
HGB BLD-MCNC: 11.3 G/DL (ref 12–16)
HIV1+2 IGG SERPL QL IA.RAPID: NORMAL
IMM GRANULOCYTES # BLD AUTO: 0.05 K/UL (ref 0–0.04)
IMM GRANULOCYTES NFR BLD AUTO: 0.6 % (ref 0–0.5)
LYMPHOCYTES # BLD AUTO: 2.1 K/UL (ref 1–4.8)
LYMPHOCYTES NFR BLD: 26.4 % (ref 18–48)
MCH RBC QN AUTO: 34.6 PG (ref 27–31)
MCHC RBC AUTO-ENTMCNC: 32.3 G/DL (ref 32–36)
MCV RBC AUTO: 107 FL (ref 82–98)
MONOCYTES # BLD AUTO: 0.5 K/UL (ref 0.3–1)
MONOCYTES NFR BLD: 6.6 % (ref 4–15)
NEUTROPHILS # BLD AUTO: 5.2 K/UL (ref 1.8–7.7)
NEUTROPHILS NFR BLD: 65.7 % (ref 38–73)
NRBC BLD-RTO: 0 /100 WBC
PHOSPHATE SERPL-MCNC: 4.5 MG/DL (ref 2.7–4.5)
PLATELET # BLD AUTO: 258 K/UL (ref 150–350)
PMV BLD AUTO: 10.1 FL (ref 9.2–12.9)
POTASSIUM SERPL-SCNC: 4.4 MMOL/L (ref 3.5–5.1)
RBC # BLD AUTO: 3.27 M/UL (ref 4–5.4)
SODIUM SERPL-SCNC: 140 MMOL/L (ref 136–145)
WBC # BLD AUTO: 7.88 K/UL (ref 3.9–12.7)

## 2020-05-14 PROCEDURE — 80048 BASIC METABOLIC PNL TOTAL CA: CPT

## 2020-05-14 PROCEDURE — 36415 COLL VENOUS BLD VENIPUNCTURE: CPT

## 2020-05-14 PROCEDURE — 63600175 PHARM REV CODE 636 W HCPCS: Performed by: HOSPITALIST

## 2020-05-14 PROCEDURE — 86038 ANTINUCLEAR ANTIBODIES: CPT

## 2020-05-14 PROCEDURE — 84100 ASSAY OF PHOSPHORUS: CPT

## 2020-05-14 PROCEDURE — 25000003 PHARM REV CODE 250: Performed by: HOSPITALIST

## 2020-05-14 PROCEDURE — 86039 ANTINUCLEAR ANTIBODIES (ANA): CPT

## 2020-05-14 PROCEDURE — 86706 HEP B SURFACE ANTIBODY: CPT

## 2020-05-14 PROCEDURE — 86703 HIV-1/HIV-2 1 RESULT ANTBDY: CPT

## 2020-05-14 PROCEDURE — 25000003 PHARM REV CODE 250: Performed by: NURSE PRACTITIONER

## 2020-05-14 PROCEDURE — 85025 COMPLETE CBC W/AUTO DIFF WBC: CPT

## 2020-05-14 PROCEDURE — 63600175 PHARM REV CODE 636 W HCPCS: Performed by: NURSE PRACTITIONER

## 2020-05-14 PROCEDURE — 86235 NUCLEAR ANTIGEN ANTIBODY: CPT | Mod: 59

## 2020-05-14 PROCEDURE — 82306 VITAMIN D 25 HYDROXY: CPT

## 2020-05-14 RX ORDER — FAMOTIDINE 20 MG/1
20 TABLET, FILM COATED ORAL 2 TIMES DAILY
Qty: 60 TABLET | Refills: 0 | Status: SHIPPED | OUTPATIENT
Start: 2020-05-14 | End: 2020-06-13

## 2020-05-14 RX ORDER — ERGOCALCIFEROL 1.25 MG/1
50000 CAPSULE ORAL
Status: DISCONTINUED | OUTPATIENT
Start: 2020-05-14 | End: 2020-05-14 | Stop reason: HOSPADM

## 2020-05-14 RX ORDER — AMOXICILLIN AND CLAVULANATE POTASSIUM 875; 125 MG/1; MG/1
1 TABLET, FILM COATED ORAL 2 TIMES DAILY
Qty: 14 TABLET | Refills: 0 | Status: SHIPPED | OUTPATIENT
Start: 2020-05-14 | End: 2020-05-21

## 2020-05-14 RX ADMIN — OXYCODONE HYDROCHLORIDE AND ACETAMINOPHEN 1 TABLET: 5; 325 TABLET ORAL at 04:05

## 2020-05-14 RX ADMIN — PANTOPRAZOLE SODIUM 40 MG: 40 TABLET, DELAYED RELEASE ORAL at 09:05

## 2020-05-14 RX ADMIN — POLYETHYLENE GLYCOL 3350 17 G: 17 POWDER, FOR SOLUTION ORAL at 09:05

## 2020-05-14 RX ADMIN — CEFTRIAXONE 1 G: 1 INJECTION, SOLUTION INTRAVENOUS at 09:05

## 2020-05-14 RX ADMIN — DIPHENHYDRAMINE HYDROCHLORIDE 6.25 MG: 50 INJECTION INTRAMUSCULAR; INTRAVENOUS at 04:05

## 2020-05-14 NOTE — PROGRESS NOTES
Renal Progress Note    Date of Admission:  5/10/2020  9:33 PM    Length of Stay: 3  Days    Subjective: n/a    Objective:    Current Facility-Administered Medications   Medication    calcium carbonate 200 mg calcium (500 mg) chewable tablet 500 mg    cefTRIAXone (ROCEPHIN) 1 g/50 mL D5W IVPB    diphenhydrAMINE injection 6.25 mg    enoxaparin injection 30 mg    oxyCODONE-acetaminophen 5-325 mg per tablet 1 tablet    pantoprazole EC tablet 40 mg    polyethylene glycol packet 17 g       Vitals:    05/13/20 2041 05/14/20 0021 05/14/20 0422 05/14/20 0723   BP: (!) 173/99 134/63 (!) 146/76 (!) 145/85   BP Location: Left arm Left arm  Left arm   Patient Position: Lying Lying Lying Lying   Pulse: 72 94 75 65   Resp: 18 18 18 17   Temp: 98.3 °F (36.8 °C) 98.3 °F (36.8 °C) 98.1 °F (36.7 °C) 98.5 °F (36.9 °C)   TempSrc: Oral Oral  Oral   SpO2: 99% 97% 98% 98%   Weight:       Height:           I/O last 3 completed shifts:  In: 1200 [P.O.:1200]  Out: 1350 [Urine:1350]  I/O this shift:  In: 240 [P.O.:240]  Out: -       Physical Exam: n/a    Laboratories:    Recent Labs   Lab 05/14/20  0412   WBC 7.88   RBC 3.27*   HGB 11.3*   HCT 35.0*      *   MCH 34.6*   MCHC 32.3       Recent Labs   Lab 05/14/20  0412   CALCIUM 8.3*      K 4.4   CO2 24      BUN 17   CREATININE 1.5*       No results for input(s): COLORU, CLARITYU, SPECGRAV, PHUR, PROTEINUA, GLUCOSEU, BLOODU, WBCU, RBCU, BACTERIA, MUCUS in the last 24 hours.    Invalid input(s):  BILIRUBINCON    Microbiology Results (last 7 days)     Procedure Component Value Units Date/Time    Urine culture **CANNOT BE ORDERED STAT** [[id number]] Collected:  05/11/20 0027    Order Status:  Completed Specimen:  Urine, Clean Catch Updated:  05/13/20 0746     Urine Culture, Routine No significant growth    Narrative:       Indicated criteria for high risk culture:->Other  Other (specify):->pyelonephritis            Diagnostic Tests:     US  RETROPERITONEAL COMPLETE    CLINICAL HISTORY:  acute kidney injury;    TECHNIQUE:  Ultrasound of the kidneys and urinary bladder was performed including color flow and Doppler evaluation of the kidneys.    COMPARISON:  05/10/2020 CT    FINDINGS:  Right kidney: The right kidney measures 9.8 cm. No cortical thinning. No loss of corticomedullary distinction. Resistive index measures 0.73.  No mass. No renal stone. No hydronephrosis.There is trace fluid surrounding the right kidney, similar to appearance on yesterday's CT.    Left kidney: The left kidney measures 10.3 cm. No cortical thinning. No loss of corticomedullary distinction. Resistive index measures 0.74.  No mass. No renal stone. No hydronephrosis.    The bladder is partially distended at the time of scanning and has an unremarkable appearance.   Impression:       Findings most consistent with medical-renal disease.  No hydronephrosis.      Electronically signed by: Jatin Kenyon MD             Assessment:       38 y/o female admitted with:     - s/p Flank pain r/o Pyelonephritis unlikely - urine C&S negative and UA not indicative for UTI and Leukocytosis RESOLVED  - BRIANNA likely pre-renal since finally resolved with IVFs  - Proteinuria estimated at > 2 grams per day et?  - Hx. Gastric sleeve  - Hypoalbuminemia  - High iPTH due to Vit D def. (severe)  - Mild anemia        Plan:    - Oral vit D2 50,000u weekly x 12 weeks  - STOP empiric IV antibiotics   - From Renal standpoint o.k. To discharge home  - Will follow up Pte. For Proteinuria  in my office upon discharge

## 2020-05-14 NOTE — PLAN OF CARE
EDUCATION:  Patient discharge instructions updated to include educational information on Pyelonephritis that will be printed on patient's AVS to review upon discharge; Information reviewed via phone with patient and include  signs and symptoms to look for and call the doctor if experiencing, and symptoms that may indicate a medical emergency: CALL 911.    All questions answered.  Teach back method used.  Patient stated that she will call MD for any severe lower back pain or blood in urine      Reviewed with  Patient things that she can do to better manage his care at home to include taking all medications as precscribed and make sure patient attend all follow up visits;    Patient stated that she does not routinely follow a physician for routine care; TN stressed importance of establishing care with a physician to overlook health needs; pt was referred to [facility] for now but encouraged to find a Physician of her choice in near future     F/U appointments were reviewed;  verbalized understanding     Nurse,  notified that all discharge planning needs have been addressed and patient can be discharged from  standpoint      05/14/20 6806   Final Note   Assessment Type Discharge Planning Assessment   Anticipated Discharge Disposition Home   What phone number can be called within the next 1-3 days to see how you are doing after discharge? [phone]   Hospital Follow Up  Appt(s) scheduled? Yes   Discharge plans and expectations educations in teach back method with documentation complete? Yes   Right Care Referral Info   Post Acute Recommendation No Care   Post-Acute Status   Post-Acute Authorization Other   Other Status No Post-Acute Service Needs   Discharge Delays None known at this time

## 2020-05-14 NOTE — DISCHARGE SUMMARY
"[facility] Hospital Medicine  Discharge Summary      Patient Name: Nando Fong  MRN: [id number]  Admission Date: 5/10/2020  Hospital Length of Stay: 3 days  Discharge Date and Time:  05/14/2020 9:56 AM  Attending Physician: Edouard Barraza MD   Discharging Provider: Edouard Barraza MD  Primary Care Provider: To Obtain Unable      HPI:   Nando Fogn 39 y.o. AA female with PMHx:GERD and gastric sleeve (about 6 years ago) who presented with complaint of 1 day history of Right CVA tenderness (7/10), with associated NV, one episode (NBNB). Reports radiating pain to upper epigastric area, sharp intermittent stabbing. She denies constipation or diarrhea, recent trauma. She denies trouble voiding and fever/chills, HA, CP, recent trauma, melana, hematochezia, hemoptysis. She denies fever/chills, melena or hematemesis. Denies dysuria or voiding difficulty.    Initial workup includes qvrblmeiuplx=311; creatinine=2.6 and AST,urinalysis unrevealing; Covid negative; CT abd/pelis significant for, "perinephric fluid and stranding on left,minimally on right; possible pyelonephritis" as well as small hernia.            * No surgery found *      Hospital Course:   Nando Fong 39 y.o. AA female with PMHx:GERD and gastric sleeve (about 6 years ago) who presented with complaint of 1 day history of Right CVA tenderness (7/10), with associated NV, one episode (NBNB). Reports radiating pain to upper epigastric area, sharp intermittent stabbing. She denies constipation or diarrhea, recent trauma. She denies trouble voiding and fever/chills, HA, CP, recent trauma, melana, hematochezia, hemoptysis. She denies fever/chills, melena or hematemesis. Denies dysuria or voiding difficulty.  Initial workup includes lmqheizvuidzj=241; creatinine=2.6 and AST,urinalysis unrevealing; Covid negative; CT abd/pelis significant for, "perinephric fluid and stranding on left,minimally on right; possible " "pyelonephritis" as well as small hernia.she was started  on IV Abx.  Nephrology was consulted for BRIANNA,was on IVF with no improvement,has urinary retention,in and out cath as needed.BHASKAR show no obstruction.PTH was 88.her ARF gradually improved,patient was discharged home with PO Abx and follow up with PCP,nephrology and GI as out patient.     Consults:   Consults (From admission, onward)        Status Ordering Provider     Inpatient consult to Nephrology  Once     Provider:  Navarro Hargrove MD    Acknowledged DIONY LAWSON     Inpatient consult to Social Work  Once     Provider:  (Not yet assigned)    Completed LAMBERTO PASCUAL          No new Assessment & Plan notes have been filed under this hospital service since the last note was generated.  Service: Hospital Medicine    Final Active Diagnoses:    Diagnosis Date Noted POA    PRINCIPAL PROBLEM:  Acute pyelonephritis [N10] 05/11/2020 Yes    Urinary retention [R33.9] 05/12/2020 Yes    Hypernatremia [E87.0] 05/11/2020 Yes    Periumbilical abdominal pain [R10.33] 05/11/2020 Yes    Acute renal failure [N17.9] 05/11/2020 Yes      Problems Resolved During this Admission:       Discharged Condition: stable    Disposition: Home or Self Care    Follow Up:  Follow-up Information     [facility] [place].    Why:  call to schedule follow up post hospital discharge and establish care for future medical needs  Contact information:  [address]  [place] [address]  [phone]             Navarro Hargrove MD In 1 week.    Specialty:  Nephrology  Why:  Hospital discharge follow up; office will call to schedule an appointment; please call number provided on Friday if you have not heard from office by then  Contact information:  [address] [place] [address] [address]  [place] [address]  [phone]             Nnamdi Hyman MD On 5/19/2020.    Specialty:  Gastroenterology  Why:  Hospital discharge follow up; appointment scheduled May 19th @ " 7278  Contact information:  [address] [place] [address] [address]  [address]  [facility]  [place] [id number]  [phone]                 Patient Instructions:      Activity as tolerated       Significant Diagnostic Studies: Labs:   BMP:   Recent Labs   Lab 05/13/20  0806 05/14/20  0412   GLU 89 74    140   K 4.4 4.4    109   CO2 22* 24   BUN 20 17   CREATININE 2.0* 1.5*   CALCIUM 8.0* 8.3*   , CMP   Recent Labs   Lab 05/13/20  0806 05/14/20  0412    140   K 4.4 4.4    109   CO2 22* 24   GLU 89 74   BUN 20 17   CREATININE 2.0* 1.5*   CALCIUM 8.0* 8.3*   ANIONGAP 6* 7*   ESTGFRAFRICA 35* 50*   EGFRNONAA 31* 43*    and CBC   Recent Labs   Lab 05/14/20  0412   WBC 7.88   HGB 11.3*   HCT 35.0*        Microbiology: Blood Culture No results found for: [facility]  Radiology: CT scan: CT ABDOMEN PELVIS WITH CONTRAST: No results found for this visit on 05/10/20. and CT ABDOMEN PELVIS WITHOUT CONTRAST:   Results for orders placed or performed during the hospital encounter of 05/10/20   CT Abdomen Pelvis  Without Contrast    Narrative    EXAMINATION:  CT ABDOMEN PELVIS WITHOUT CONTRAST    CLINICAL HISTORY:  Abd pain, gastroenteritis or colitis suspected;    TECHNIQUE:  Low dose axial images, sagittal and coronal reformations were obtained from the lung bases to the pubic symphysis, Oral contrast was not administered.    COMPARISON:  07/09/2018    FINDINGS:  Heart: Normal in size. No pericardial effusion.    Lung Bases: Well aerated, without consolidation or pleural fluid.    Liver: Normal in size and attenuation, with no focal hepatic lesions.    Gallbladder: No calcified gallstones.    Bile Ducts: No evidence of dilated ducts.    Pancreas: No mass or peripancreatic fat stranding.    Spleen: Unremarkable.    Adrenals: Unremarkable.    Kidneys/ Ureters: No stones are detected bilaterally.  There is mild left perinephric fluid and stranding present.  Minimal stranding on the right  also.  No mass is detected.  No hydronephrosis bilaterally.  An infectious/inflammatory process such as pyelonephritis is a consideration.  Recommend clinical correlation and follow-up.    Bladder: No evidence of wall thickening.    Reproductive organs: The uterus is present.  Intrauterine device is present.  The intrauterine device is situated in the lower uterine segment and cervix.  Recommend GYN follow-up for better positioning.    4.1 cm left ovarian cyst.  Recommend outpatient sonographic surveillance.    GI Tract/Mesentery: No evidence of bowel obstruction or inflammation.    Postoperative changes of the stomach.    Peritoneal Space: No ascites. No free air.    Retroperitoneum: No significant adenopathy.    Abdominal wall: Small fat containing right anterior lateral abdominal wall hernia through the abdominal musculature on axial 32 possibly related to a prior surgical tract.  Minimal stranding in the left anterior abdomen subcutaneous fat.    Minimal fat containing umbilical hernia also.  These findings are unchanged.    Vasculature: No significant atherosclerosis or aneurysm.    Bones: No acute fracture.      Impression    1. Perinephric fluid and stranding on the left and minimally on the right.  This could represent pyelonephritis.  Recommend clinical correlation and follow-up.  See above comments.  2. Intrauterine device present in the lower uterine segment and cervix.  This is unchanged from the prior study.  Recommend GYN follow-up for better positioning.  3. 4.1 cm left ovarian cyst.  Recommend outpatient sonographic surveillance.  4. Chronic and postoperative changes.  See above comments.  5.  This report was flagged in Epic as abnormal.      Electronically signed by: Saji Joe  Date:    05/10/2020  Time:    23:14       Pending Diagnostic Studies:     Procedure Component Value Units Date/Time    VINH [[id number]] Collected:  05/14/20 0412    Order Status:  Sent Lab Status:  In process Updated:   05/14/20 0729    Specimen:  Blood          Medications:  Reconciled Home Medications:      Medication List      START taking these medications    amoxicillin-clavulanate 875-125mg 875-125 mg per tablet  Commonly known as:  AUGMENTIN  Take 1 tablet by mouth 2 (two) times daily. for 7 days     famotidine 20 MG tablet  Commonly known as:  PEPCID  Take 1 tablet (20 mg total) by mouth 2 (two) times daily.        CONTINUE taking these medications    levonorgestreL 20 mcg/24 hours (5 yrs) 52 mg IUD  Commonly known as:  MIRENA  1 each by Intrauterine route once.            Indwelling Lines/Drains at time of discharge:   Lines/Drains/Airways     None                 Time spent on the discharge of patient: over 30  minutes  Patient was seen and examined on the date of discharge and determined to be suitable for discharge.         Edouard Barraza MD  Department of Hospital Medicine  [facility]

## 2020-05-14 NOTE — NURSING
Pt to be discharged per MD order. Respirations are even and unlabored.  IV d/c'd from L FA. Catheter intact. VS are WNL. Pt educated on discharge instructions and to follow up with her primary doctor. Will continue to monitor.

## 2020-05-15 LAB
ANA PATTERN 1: NORMAL
ANA SER QL IF: POSITIVE
ANA TITR SER IF: NORMAL {TITER}

## 2020-05-19 LAB
ANTI SM ANTIBODY: 0.1 RATIO (ref 0–0.99)
ANTI SM/RNP ANTIBODY: 0.17 RATIO (ref 0–0.99)
ANTI-SM INTERPRETATION: NEGATIVE
ANTI-SM/RNP INTERPRETATION: NEGATIVE
ANTI-SSA ANTIBODY: 0.06 RATIO (ref 0–0.99)
ANTI-SSA INTERPRETATION: NEGATIVE
ANTI-SSB ANTIBODY: 0.07 RATIO (ref 0–0.99)
ANTI-SSB INTERPRETATION: NEGATIVE
DSDNA AB SER-ACNC: NORMAL [IU]/ML

## 2021-04-15 ENCOUNTER — PATIENT MESSAGE (OUTPATIENT)
Dept: RESEARCH | Facility: HOSPITAL | Age: 41
End: 2021-04-15

## 2021-08-13 ENCOUNTER — HOSPITAL ENCOUNTER (EMERGENCY)
Facility: HOSPITAL | Age: 41
Discharge: HOME | End: 2021-08-13
Attending: EMERGENCY MEDICINE
Payer: COMMERCIAL

## 2021-08-13 VITALS
OXYGEN SATURATION: 100 % | SYSTOLIC BLOOD PRESSURE: 128 MMHG | RESPIRATION RATE: 18 BRPM | TEMPERATURE: 99 F | HEART RATE: 82 BPM | DIASTOLIC BLOOD PRESSURE: 89 MMHG

## 2021-08-13 DIAGNOSIS — B34.9 VIRAL SYNDROME: ICD-10-CM

## 2021-08-13 DIAGNOSIS — J30.9 ALLERGIC RHINITIS, UNSPECIFIED SEASONALITY, UNSPECIFIED TRIGGER: ICD-10-CM

## 2021-08-13 DIAGNOSIS — H65.192 OTHER NON-RECURRENT ACUTE NONSUPPURATIVE OTITIS MEDIA OF LEFT EAR: Primary | ICD-10-CM

## 2021-08-13 LAB
B-HCG UR QL: NEGATIVE
CTP QC/QA: YES
CTP QC/QA: YES
POC RAPID STREP A: NEGATIVE
SARS-COV-2 RDRP RESP QL NAA+PROBE: NEGATIVE

## 2021-08-13 PROCEDURE — 81025 URINE PREGNANCY TEST: CPT | Mod: ER | Performed by: NURSE PRACTITIONER

## 2021-08-13 PROCEDURE — U0002 COVID-19 LAB TEST NON-CDC: HCPCS | Mod: ER | Performed by: NURSE PRACTITIONER

## 2021-08-13 PROCEDURE — 99284 EMERGENCY DEPT VISIT MOD MDM: CPT | Mod: 25,ER

## 2021-08-13 RX ORDER — IBUPROFEN 600 MG/1
600 TABLET ORAL EVERY 6 HOURS PRN
Qty: 20 TABLET | Refills: 0 | Status: SHIPPED | OUTPATIENT
Start: 2021-08-13

## 2021-08-13 RX ORDER — FLUTICASONE PROPIONATE 50 MCG
1 SPRAY, SUSPENSION (ML) NASAL 2 TIMES DAILY PRN
Qty: 15 G | Refills: 0 | Status: SHIPPED | OUTPATIENT
Start: 2021-08-13

## 2021-08-13 RX ORDER — AMOXICILLIN 500 MG/1
500 CAPSULE ORAL 2 TIMES DAILY
Qty: 20 CAPSULE | Refills: 0 | Status: SHIPPED | OUTPATIENT
Start: 2021-08-13 | End: 2021-08-23

## 2021-08-13 RX ORDER — CETIRIZINE HYDROCHLORIDE 10 MG/1
10 TABLET ORAL DAILY
Qty: 30 TABLET | Refills: 0 | Status: SHIPPED | OUTPATIENT
Start: 2021-08-13 | End: 2022-08-13

## 2021-12-21 ENCOUNTER — LAB VISIT (OUTPATIENT)
Dept: PRIMARY CARE CLINIC | Facility: OTHER | Age: 41
End: 2021-12-21
Attending: INTERNAL MEDICINE
Payer: COMMERCIAL

## 2021-12-21 DIAGNOSIS — Z20.822 ENCOUNTER FOR LABORATORY TESTING FOR COVID-19 VIRUS: ICD-10-CM

## 2021-12-21 PROCEDURE — U0003 INFECTIOUS AGENT DETECTION BY NUCLEIC ACID (DNA OR RNA); SEVERE ACUTE RESPIRATORY SYNDROME CORONAVIRUS 2 (SARS-COV-2) (CORONAVIRUS DISEASE [COVID-19]), AMPLIFIED PROBE TECHNIQUE, MAKING USE OF HIGH THROUGHPUT TECHNOLOGIES AS DESCRIBED BY CMS-2020-01-R: HCPCS | Performed by: INTERNAL MEDICINE

## 2021-12-22 LAB
SARS-COV-2 RNA RESP QL NAA+PROBE: NOT DETECTED
SARS-COV-2- CYCLE NUMBER: NORMAL

## 2021-12-29 ENCOUNTER — LAB VISIT (OUTPATIENT)
Dept: PRIMARY CARE CLINIC | Facility: OTHER | Age: 41
End: 2021-12-29
Attending: INTERNAL MEDICINE
Payer: COMMERCIAL

## 2021-12-29 ENCOUNTER — PATIENT MESSAGE (OUTPATIENT)
Dept: ADMINISTRATIVE | Facility: OTHER | Age: 41
End: 2021-12-29
Payer: COMMERCIAL

## 2021-12-29 DIAGNOSIS — Z20.822 ENCOUNTER FOR LABORATORY TESTING FOR COVID-19 VIRUS: ICD-10-CM

## 2021-12-29 PROCEDURE — U0003 INFECTIOUS AGENT DETECTION BY NUCLEIC ACID (DNA OR RNA); SEVERE ACUTE RESPIRATORY SYNDROME CORONAVIRUS 2 (SARS-COV-2) (CORONAVIRUS DISEASE [COVID-19]), AMPLIFIED PROBE TECHNIQUE, MAKING USE OF HIGH THROUGHPUT TECHNOLOGIES AS DESCRIBED BY CMS-2020-01-R: HCPCS | Performed by: INTERNAL MEDICINE

## 2021-12-31 LAB
SARS-COV-2 RNA RESP QL NAA+PROBE: DETECTED
SARS-COV-2- CYCLE NUMBER: 17

## 2022-06-28 NOTE — ED NOTES
here to p/u pt.      From: Maria Luisa Padron  To: Sd Reyna  Sent: 6/28/2022 8:42 AM CDT  Subject: Infected head wound    Check it out pretty narly!

## 2023-07-27 ENCOUNTER — OFFICE VISIT (OUTPATIENT)
Dept: FAMILY MEDICINE | Facility: CLINIC | Age: 43
End: 2023-07-27
Payer: COMMERCIAL

## 2023-07-27 ENCOUNTER — PATIENT MESSAGE (OUTPATIENT)
Dept: FAMILY MEDICINE | Facility: CLINIC | Age: 43
End: 2023-07-27

## 2023-07-27 DIAGNOSIS — J01.80 ACUTE NON-RECURRENT SINUSITIS OF OTHER SINUS: Primary | ICD-10-CM

## 2023-07-27 PROCEDURE — 99203 PR OFFICE/OUTPT VISIT, NEW, LEVL III, 30-44 MIN: ICD-10-PCS | Mod: 95,,, | Performed by: FAMILY MEDICINE

## 2023-07-27 PROCEDURE — 99203 OFFICE O/P NEW LOW 30 MIN: CPT | Mod: 95,,, | Performed by: FAMILY MEDICINE

## 2023-07-27 RX ORDER — AZITHROMYCIN 250 MG/1
TABLET, FILM COATED ORAL
Qty: 6 TABLET | Refills: 0 | Status: SHIPPED | OUTPATIENT
Start: 2023-07-27 | End: 2023-08-01

## 2023-07-27 RX ORDER — PROMETHAZINE HYDROCHLORIDE AND DEXTROMETHORPHAN HYDROBROMIDE 6.25; 15 MG/5ML; MG/5ML
5 SYRUP ORAL EVERY 12 HOURS PRN
Qty: 180 ML | Refills: 0 | Status: SHIPPED | OUTPATIENT
Start: 2023-07-27 | End: 2023-08-06

## 2023-07-27 NOTE — PROGRESS NOTES
Chief Complaint   Patient presents with    URI       The patient location is:  Patient Home   The chief complaint leading to consultation is: uri  Visit type: Virtual visit with synchronous audio and video  Total time spent with patient: 15 min  Each patient to whom he or she provides medical services by telemedicine is:  (1) informed of the relationship between the physician and patient and the respective role of any other health care provider with respect to management of the patient; and (2) notified that he or she may decline to receive medical services by telemedicine and may withdraw from such care at any time.      MARIXA Fong is a 42 y.o. female with multiple medical diagnoses as listed in the medical history and problem list that presents for evaluation for URI    URI- symptoms started yesterday, she has been having headache and weakness along with sore throat, temperature was 98.3, she has tried tylenol cold and flu, she has not had an appetite, she has not had nausea/vomiting, no difficulty breathing, no history of asthma, she has taken a home COVID test and it is negative    PAST MEDICAL HISTORY:  Past Medical History:   Diagnosis Date    GERD (gastroesophageal reflux disease)        PAST SURGICAL HISTORY:  Past Surgical History:   Procedure Laterality Date    BREAST SURGERY      gastric sleeve         SOCIAL HISTORY:  Social History     Socioeconomic History    Marital status:    Tobacco Use    Smoking status: Never    Smokeless tobacco: Never   Substance and Sexual Activity    Alcohol use: Yes     Comment: socially    Drug use: No    Sexual activity: Yes     Partners: Male     Birth control/protection: I.U.D.       FAMILY HISTORY:  Family History   Problem Relation Age of Onset    Breast cancer Neg Hx     Colon cancer Neg Hx     Ovarian cancer Neg Hx        ALLERGIES AND MEDICATIONS: updated and reviewed.  Review of patient's allergies indicates:   Allergen Reactions    Morphine  Itching    Percocet [oxycodone-acetaminophen]      Medication List with Changes/Refills   New Medications    AZITHROMYCIN (Z-LISA) 250 MG TABLET    Take 2 tablets by mouth on day 1; Take 1 tablet by mouth on days 2-5    PROMETHAZINE-DEXTROMETHORPHAN (PROMETHAZINE-DM) 6.25-15 MG/5 ML SYRP    Take 5 mLs by mouth every 12 (twelve) hours as needed.   Current Medications    CETIRIZINE (ZYRTEC) 10 MG TABLET    Take 1 tablet (10 mg total) by mouth once daily.    FAMOTIDINE (PEPCID) 20 MG TABLET    Take 1 tablet (20 mg total) by mouth 2 (two) times daily.    FLUTICASONE PROPIONATE (FLONASE) 50 MCG/ACTUATION NASAL SPRAY    1 spray (50 mcg total) by Each Nostril route 2 (two) times daily as needed for Rhinitis or Allergies.    IBUPROFEN (ADVIL,MOTRIN) 600 MG TABLET    Take 1 tablet (600 mg total) by mouth every 6 (six) hours as needed for Pain.    LEVONORGESTREL (MIRENA) 20 MCG/24 HR (5 YEARS) IUD    1 each by Intrauterine route once.       ROS  Review of Systems   Constitutional:  Negative for activity change and unexpected weight change.   HENT:  Positive for trouble swallowing. Negative for hearing loss and rhinorrhea.    Eyes:  Negative for discharge and visual disturbance.   Respiratory:  Negative for chest tightness and wheezing.    Cardiovascular:  Negative for chest pain and palpitations.   Gastrointestinal:  Negative for blood in stool, constipation, diarrhea and vomiting.   Endocrine: Negative for polydipsia and polyuria.   Genitourinary:  Negative for difficulty urinating, dysuria, hematuria and menstrual problem.   Musculoskeletal:  Positive for neck pain. Negative for arthralgias and joint swelling.   Neurological:  Positive for weakness and headaches.   Psychiatric/Behavioral:  Negative for confusion and dysphoric mood.      Physical Exam  There were no vitals filed for this visit. There is no height or weight on file to calculate BMI.            Physical Exam  Vitals and nursing note reviewed.   Constitutional:        Appearance: She is well-developed.   Skin:     General: Skin is warm and dry.      Findings: No erythema or rash.   Neurological:      Mental Status: She is alert. Mental status is at baseline.   Psychiatric:         Behavior: Behavior normal.       Health Maintenance         Date Due Completion Date    COVID-19 Vaccine (1) Never done ---    TETANUS VACCINE Never done ---    Cervical Cancer Screening 09/13/2019 9/13/2018    Mammogram 06/17/2022 6/17/2021    Influenza Vaccine (1) 09/01/2023 ---            Health maintenance reviewed and addressed as ordered      ASSESSMENT     1. Acute non-recurrent sinusitis of other sinus        PLAN:     Problem List Items Addressed This Visit    None  Visit Diagnoses       Acute non-recurrent sinusitis of other sinus    -  Primary    Relevant Medications    azithromycin (Z-LISA) 250 MG tablet    promethazine-dextromethorphan (PROMETHAZINE-DM) 6.25-15 mg/5 mL Syrp          Discussed that if symptoms don't improve in the next two days she should repeat her home COVID test or present to urgent care for evaluation    She has a complaint of knee pain that I recommend she be seen in person for    Amara Porter MD  07/27/2023 10:04 AM        No follow-ups on file.    No orders of the defined types were placed in this encounter.